# Patient Record
Sex: FEMALE | Race: WHITE | NOT HISPANIC OR LATINO | Employment: FULL TIME | URBAN - METROPOLITAN AREA
[De-identification: names, ages, dates, MRNs, and addresses within clinical notes are randomized per-mention and may not be internally consistent; named-entity substitution may affect disease eponyms.]

---

## 2015-12-28 LAB — HM COLONOSCOPY: NORMAL

## 2017-01-27 ENCOUNTER — GENERIC CONVERSION - ENCOUNTER (OUTPATIENT)
Dept: OTHER | Facility: OTHER | Age: 52
End: 2017-01-27

## 2017-02-03 ENCOUNTER — ALLSCRIPTS OFFICE VISIT (OUTPATIENT)
Dept: OTHER | Facility: OTHER | Age: 52
End: 2017-02-03

## 2017-04-07 ENCOUNTER — GENERIC CONVERSION - ENCOUNTER (OUTPATIENT)
Dept: OTHER | Facility: OTHER | Age: 52
End: 2017-04-07

## 2017-04-28 ENCOUNTER — GENERIC CONVERSION - ENCOUNTER (OUTPATIENT)
Dept: OTHER | Facility: OTHER | Age: 52
End: 2017-04-28

## 2017-04-28 ENCOUNTER — TRANSCRIBE ORDERS (OUTPATIENT)
Dept: ADMINISTRATIVE | Facility: HOSPITAL | Age: 52
End: 2017-04-28

## 2017-04-28 DIAGNOSIS — R20.1 HYPOESTHESIA: Primary | ICD-10-CM

## 2017-05-01 ENCOUNTER — GENERIC CONVERSION - ENCOUNTER (OUTPATIENT)
Dept: OTHER | Facility: OTHER | Age: 52
End: 2017-05-01

## 2017-05-12 ENCOUNTER — HOSPITAL ENCOUNTER (OUTPATIENT)
Dept: MRI IMAGING | Facility: HOSPITAL | Age: 52
Discharge: HOME | End: 2017-05-12
Payer: COMMERCIAL

## 2017-05-12 DIAGNOSIS — R20.1 HYPOESTHESIA: ICD-10-CM

## 2017-05-12 PROCEDURE — 72157 MRI CHEST SPINE W/O & W/DYE: CPT

## 2017-05-12 PROCEDURE — A9585 GADOBUTROL INJECTION: HCPCS | Performed by: RADIOLOGY

## 2017-05-12 RX ADMIN — GADOBUTROL 10 ML: 604.72 INJECTION INTRAVENOUS at 19:41

## 2017-06-12 ENCOUNTER — GENERIC CONVERSION - ENCOUNTER (OUTPATIENT)
Dept: OTHER | Facility: OTHER | Age: 52
End: 2017-06-12

## 2017-07-14 ENCOUNTER — GENERIC CONVERSION - ENCOUNTER (OUTPATIENT)
Dept: OTHER | Facility: OTHER | Age: 52
End: 2017-07-14

## 2017-08-04 ENCOUNTER — ALLSCRIPTS OFFICE VISIT (OUTPATIENT)
Dept: OTHER | Facility: OTHER | Age: 52
End: 2017-08-04

## 2017-09-12 DIAGNOSIS — Z12.31 ENCOUNTER FOR SCREENING MAMMOGRAM FOR MALIGNANT NEOPLASM OF BREAST: ICD-10-CM

## 2017-10-16 ENCOUNTER — TRANSCRIBE ORDERS (OUTPATIENT)
Dept: ADMINISTRATIVE | Facility: HOSPITAL | Age: 52
End: 2017-10-16

## 2017-10-16 DIAGNOSIS — R13.14 DYSPHAGIA, PHARYNGOESOPHAGEAL PHASE: Primary | ICD-10-CM

## 2017-10-20 ENCOUNTER — GENERIC CONVERSION - ENCOUNTER (OUTPATIENT)
Dept: OTHER | Facility: OTHER | Age: 52
End: 2017-10-20

## 2017-10-20 ENCOUNTER — HOSPITAL ENCOUNTER (OUTPATIENT)
Dept: MAMMOGRAPHY | Facility: CLINIC | Age: 52
Discharge: HOME | End: 2017-10-20
Payer: COMMERCIAL

## 2017-10-20 DIAGNOSIS — R92.8 OTHER ABNORMAL AND INCONCLUSIVE FINDINGS ON DIAGNOSTIC IMAGING OF BREAST: ICD-10-CM

## 2017-10-20 DIAGNOSIS — Z12.31 ENCOUNTER FOR SCREENING MAMMOGRAM FOR MALIGNANT NEOPLASM OF BREAST: ICD-10-CM

## 2017-10-20 PROCEDURE — 77063 BREAST TOMOSYNTHESIS BI: CPT

## 2017-10-20 PROCEDURE — G0202 SCR MAMMO BI INCL CAD: HCPCS

## 2017-10-25 ENCOUNTER — HOSPITAL ENCOUNTER (OUTPATIENT)
Dept: RADIOLOGY | Facility: HOSPITAL | Age: 52
Discharge: HOME | End: 2017-10-25
Attending: INTERNAL MEDICINE
Payer: COMMERCIAL

## 2017-10-25 DIAGNOSIS — R13.14 DYSPHAGIA, PHARYNGOESOPHAGEAL PHASE: ICD-10-CM

## 2017-10-25 PROCEDURE — 74220 X-RAY XM ESOPHAGUS 1CNTRST: CPT

## 2017-10-27 ENCOUNTER — HOSPITAL ENCOUNTER (OUTPATIENT)
Dept: ULTRASOUND IMAGING | Facility: CLINIC | Age: 52
Discharge: HOME | End: 2017-10-27
Payer: COMMERCIAL

## 2017-10-27 DIAGNOSIS — R92.8 OTHER ABNORMAL AND INCONCLUSIVE FINDINGS ON DIAGNOSTIC IMAGING OF BREAST: ICD-10-CM

## 2017-10-27 PROCEDURE — 76642 ULTRASOUND BREAST LIMITED: CPT

## 2018-01-11 NOTE — RESULT NOTES
Verified Results  * MRI SHOULDER LEFT WO CONTRAST [id number] 06:18PM Pasquale Skiff Order Number: [id number]    - Patient Instructions: To schedule this appointment, please contact Central Scheduling at [phone]  Test Name Result Flag Reference   MRI SHOULDER LEFT WO CONTRAST (Report)     This is a summary report  The complete report is available in the patient's medical record  If you cannot access the medical record, please contact the sending organization for a detailed fax or copy  MRI LEFT SHOULDER     INDICATION: Left shoulder pain with decreased range of motion  COMPARISON: Plain film dated 11/1/2016     TECHNIQUE:  The following MR sequences were obtained of the left shoulder: Localizer, axial GRE/PD fat sat, oblique coronal T2 fat sat, oblique sagittal T1/T2 fat sat  Images were acquired on a 1 5 Sarika unit  Gadolinium was not used  FINDINGS:     SUBCUTANEOUS TISSUES: Normal     JOINT EFFUSION: Small effusion  ACROMION PROCESS: Small subacromial spur  ROTATOR CUFF: Supraspinatus and infraspinatus insertional tendinosis without evidence of full-thickness component rotator cuff tear  The remaining muscles and tendons of the rotator cuff appear intact without muscle atrophy or fatty infiltration  SUBACROMIAL/SUBDELTOID BURSA: Trace bursal fluid  LONG HEAD OF BICEPS TENDON: Mild proximal biceps tendinosis without tear  GLENOID LABRUM: Intact  GLENOHUMERAL JOINT: Degenerative change noted with focal full-thickness cartilage loss at the anteroinferior glenoid  These findings are best appreciated on series 3 image 13 measuring up to 4 mm  ACROMIOCLAVICULAR JOINT: Normal      BONE MARROW SIGNAL: Normal        IMPRESSION:   1  Small subacromial spur with mild supraspinatus/infraspinatus insertional tendinosis but no rotator cuff tear  2  Mild proximal biceps tendinosis without tear     3  Glenohumeral degenerative change with focal full-thickness glenoid cartilage loss         Workstation performed: [id number]     Signed by:   Jt Power MD   11/8/16        )Pt aware & she is going to contact the [facility] she used in the past     Discussion/Summary   please notify pt that her MRI of the shoulder showed no rotator cuff tear but it did show tendonitis of the rotator cuff and bicep tendon tendonitis as well as significant arthritis of the shoulder with loss of cartilage, she needs to see Ortho for further eval

## 2018-01-12 VITALS
BODY MASS INDEX: 30.92 KG/M2 | HEIGHT: 70 IN | TEMPERATURE: 98.9 F | WEIGHT: 216 LBS | HEART RATE: 72 BPM | DIASTOLIC BLOOD PRESSURE: 70 MMHG | SYSTOLIC BLOOD PRESSURE: 122 MMHG

## 2018-01-12 VITALS
HEART RATE: 68 BPM | TEMPERATURE: 98.3 F | SYSTOLIC BLOOD PRESSURE: 112 MMHG | DIASTOLIC BLOOD PRESSURE: 62 MMHG | BODY MASS INDEX: 32.07 KG/M2 | WEIGHT: 224 LBS | HEIGHT: 70 IN

## 2018-01-13 NOTE — RESULT NOTES
Verified Results  (1) PTH N-TERMINAL (INTACT) [id number] 08:39AM Desirae Flair     Test Name Result Flag Reference   PTH, Intact 26 pg/mL  15-65        pt aware    Discussion/Summary   please notify pt that her calcium, phos and parathryoid levels were all nml - does not indicate any parthryoid abnormality - may discuss further with endo if she wishes

## 2018-01-13 NOTE — RESULT NOTES
Verified Results  (1) VITAMIN B12 [id number] 08:39AM Phuc Peña     Test Name Result Flag Reference   Vitamin B12 801 pg/mL  211-946     (1) FOLATE [id number] 08:39AM Phuc Peña     Test Name Result Flag Reference   Folate (Folic Acid), Serum 4 4 ng/mL  >3 0   A serum folate concentration of less than 3 1 ng/mL is  considered to represent clinical deficiency  ([address] [facility]) Lyme Ab/Western Blot Reflex [id number] 08:39AM Phuc Peña     Test Name Result Flag Reference   Lyme IgG/IgM Ab <0 91 ISR  0 00-0 90   Negative         <0 91                                                 Equivocal  0 91 - 1 09                                                 Positive         >1 09   Lyme Disease Ab, Quant, IgM <0 80 index  0 00-0 79   Negative         <0 80                                                 Equivocal  0 80 - 1 19                                                 Positive         >1 19                  IgM levels may peak at 3-6 weeks post infection, then                  gradually decline         Discussion/Summary   please notify pt that her Bw was all wnl; will call with MRI results when they come through after she has the imaging done

## 2018-01-13 NOTE — RESULT NOTES
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

## 2018-01-14 NOTE — RESULT NOTES
Verified Results  * MRI BRAIN WO CONTRAST [id number] 06:05PM Bety De Souza Order Number: [id number]    - Patient Instructions: To schedule this appointment, please contact Central Scheduling at [phone]  Test Name Result Flag Reference   MRI BRAIN WO CONTRAST (Report)     This is a summary report  The complete report is available in the patient's medical record  If you cannot access the medical record, please contact the sending organization for a detailed fax or copy  MRI BRAIN WITHOUT CONTRAST     INDICATION: Numbness, dizziness     COMPARISON:  MR 10/15/2009     TECHNIQUE: Sagittal T1, axial T2, axial FLAIR, axial T1, axial Gradient and axial diffusion imaging  IMAGE QUALITY: Diagnostic  FINDINGS:     BRAIN PARENCHYMA: There is no discrete mass, mass effect or midline shift  No abnormal white matter signal identified  Brainstem and cerebellum demonstrate normal signal  There is no intracranial hemorrhage  There is no evidence of acute infarction and   diffusion imaging is unremarkable  VENTRICLES: The ventricles are normal in size and contour  SELLA AND PITUITARY GLAND: Normal      ORBITS: Normal      PARANASAL SINUSES: Scattered retention cyst or polyps evident in both maxillary sinuses  VASCULATURE: Evaluation of the major intracranial vasculature demonstrates appropriate flow voids  CALVARIUM AND SKULL BASE: Normal      EXTRACRANIAL SOFT TISSUES: Normal        IMPRESSION:     Normal MR of the brain, unchanged since prior study 7 years earlier  Workstation performed: [id number]     Signed by:    Kaleb Burger MD   9/21/16        Pt aware    Discussion/Summary   please notify pt that her MRI of the brain was nml

## 2018-01-15 NOTE — RESULT NOTES
Verified Results  * XR SHOULDER 2+ VIEW LEFT [id number] 09:13AM Virginia Sims Order Number: [id number]     Test Name Result Flag Reference   XR SHOULDER 2+ VW LEFT (Report)     LEFT SHOULDER     INDICATION: Left shoulder discomfort  Difficulty with certainty  COMPARISON: February 2007     VIEWS: 3; 3 images     FINDINGS:     There is no acute fracture or dislocation  No degenerative changes  No lytic or blastic lesions are seen  Soft tissues are unremarkable  IMPRESSION:     No acute osseous abnormality         Workstation performed: [id number]     Signed by:   Mila Chase MD   11/1/16        Pt aware    Discussion/Summary   please notify pt that her shoulder Xray was nml - will prior - auth MRI of shoulder and we will let pt know when/if insurance approves it; would recommend heat/massage/gentle stretches and OTC Ibuprofen/Advil 200 mg 2-3 tab PO q 8 hrs prn pain

## 2018-01-16 NOTE — MISCELLANEOUS
Message   Recorded as Task   Date: 09/13/2016 12:05 PM, Created By: Erin Butt   Task Name: Follow Up   Assigned To: [address]   Regarding Patient: Berny Maravilla, Status: Active   Comment:    Desi Montenegro - 13 Sep 2016 12:05 PM     TASK CREATED  please notify pt that I did receive her BW results from her recent ER visit and her thyroid tests were wnl - con't with current plan as discussed at today's appt   Jennifer Easton - 13 Sep 2016 2:17 PM     TASK EDITED  Pt aware        Active Problems    1  Abnormal mammogram of right breast (793 80) (R92 8)   2  Bulging lumbar disc (722 10) (M51 26)   3  Cervicalgia (723 1) (M54 2)   4  Colon cancer screening (V76 51) (Z12 11)   5  Colonoscopy (Fiberoptic)   6  Compression of common peroneal nerve, 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)   7  Dizziness (780 4) (R42)   8  Encounter for screening mammogram for malignant neoplasm of breast (V76 12)   (Z12 31)   9  GERD (gastroesophageal reflux disease) (530 81) (K21 9)   10  Hypothyroidism (244 9) (E03 9)   11  Lower back pain (724 2) (M54 5)   12  Numbness (782 0) (R20 0)   13  PPD screening test (V74 1) (Z11 1)   14  Restless legs syndrome (333 94) (G25 81)   15  Sensory neuropathy (356 9) (G62 9)   16  Shoulder pain, left (719 41) (M25 512)   17  Skin lesion (709 9) (L98 9)   18  Vitamin D deficiency (268 9) (E55 9)    Current Meds   1  Calcium Citrate Plus Oral Tablet; TAKE 1 TABLET DAILY; Therapy: 27ZSA0411 to Recorded   2  [employer] Iron 325 (65 Fe) MG Oral Tablet; 1 tab PO q day; Therapy: 29JPI5471 to Recorded   3  Lysine HCl - 1000 MG Oral Tablet; TAKE AS DIRECTED; Therapy: 20KJF8063 to Recorded   4  Synthroid 75 MCG Oral Tablet; take 1 tablet every day; Therapy: 30AWI0538 to Recorded   5  Vitamin D3 1000 UNIT Oral Tablet; TAKE 1 TABLET DAILY; Therapy: 74ART0266 to (Evaluate:03Jun2016) Recorded   6  Vitamin E 400 UNIT Oral Tablet; TAKE 1 TABLET DAILY;    Therapy: 29WWN5160 to Recorded    Allergies    1   Penicillins    Signatures   Electronically signed by : Ashley Thomposn DO; Sep 13 2016  2:37PM EST                       (Author)

## 2018-01-16 NOTE — RESULT NOTES
Discussion/Summary    please notify pt that her mammo just showed a small 1 4 cm nodule at the R outer breast that needs further imaging to evaluate it - orders in allscripts and someone from breast center will be calling pt to schedule this further imaging, call our office if not heard from them by Tuesday next week  Pt aware         Verified Results  [address] [place] CAD [id number] 08:19AM Bety Going Order Number: [id number]    - Patient Instructions: To schedule this appointment, please contact Central Scheduling at [phone]  Do not wear any perfume, powder, lotion or deodorant on breast or underarm area  Please bring your doctors order, referral (if needed) and insurance information with you on the day of the test  Failure to bring this information may result in this test being rescheduled  Arrive 15 minutes prior to your appointment time to register  On the day of your test, please bring any prior mammogram or breast studies with you that were not performed at a [facility]  Failure to bring prior exams may result in your test needing to be rescheduled  Test Name Result Flag Reference   MAMMO SCREENING BILATERAL W 3D & CAD (Report)     Patient History:   Patient is nulliparous  Family history of unknown cancer at age 48 or over in paternal    aunt  Patient has never smoked  Patient's BMI is 31 3  Reason for exam: screening, asymptomatic  Screening     Mammo Screening Bilateral W DBT and CAD: October 20, 2017 - Check   In #: [de-identified]   2D/3D Procedure   3D views: Bilateral MLO view(s) were taken  2D views: Bilateral CC view(s) were taken  Technologist: Gianna Francisco R T (R)(M)   Prior study comparison: September 12, 2016, mammo diagnostic    right W CAD, performed at [address]  September 2, 2016, mammo screening bilateral W CAD, performed at    [facility]   June 13, 2015,    digital bilateral screening mammogram performed at [facility]  July 6, 2012, bilateral OPMA    digital scrn mammo w/CAD, performed at [address] [place] [address]  July 18, 2011, left breast unilateral    diagnostic mammogram, performed at [address] [facility]  June 8, 2011, bilateral OPMA digital scrn mammo w/CAD,    performed at [address] [place] [address]  June 7, 2010, bilateral OPMA digital scrn mammo w/CAD, performed at    [address] [place] [address]  The breast tissue is heterogeneously dense, potentially limiting    the sensitivity of mammography  Patient risk, included in this    report, assists in determining the appropriate screening regimen    (such as 3-D mammography or the inclusion of automated breast    ultrasound or MRI)  3-D mammography may also remain indicated as    screening  There is a 1 4 cm nodule in the upper outer right    breast at the 10 o'clock position, approximately 11 cm from the    nipple  Further evaluation with ultrasound is recommended  The parenchymal pattern is otherwise stable  There is a stable    nodule seen in the medial right breast, mid breast depth, shown    to correspond to a cyst on prior ultrasound  No dominant soft    tissue mass, architectural distortion or suspicious    calcifications are noted in either breast  The skin and nipple    contours are within normal limits  1  Outer right breast 1 4 cm nodule  Further evaluation    ultrasound is recommended  2  Stable left mammogram      ACR BI-RADSï¾® Assessments: BiRad:0 - Incomplete: needs additional    imaging evaluation - Right     Recommendation:   Further imaging of the right breast    A breast health care nurse from our facility will be contacting    the patient regarding the need for additional imaging  The patient is scheduled in a reminder system for screening    mammography       8-10% of cancers will be missed on mammography  Management of a    palpable abnormality must be based on clinical grounds  Patients    will be notified of their results via letter from our facility  Accredited by [employer] and FDA  Transcription Location: MARKUS [address]ust[address]: [id number]     Risk Value(s):   Tyrer-Cuzick 10 Year: 2 300%, Tyrer-Cuzick Lifetime: 10 000%,    Myriad Table: 1 5%, VARINDER 5 Year: 1 0%, NCI Lifetime: 9 1%   Signed by:   Nba Osborne MD   10/20/17       Plan  Abnormal mammogram of right breast    · *US BREAST RIGHT LIMITED (DIAGNOSTIC);  Status:Hold For - Scheduling; Requested  [facility]:[id number];

## 2018-01-17 NOTE — RESULT NOTES
Verified Results  * MAMMO SCREENING BILATERAL W CAD [id number] 07:56AM Lester Greer     Test Name Result Flag Reference   MAMMO SCREENING BILATERAL W CAD (Report)     Patient History:   Patient is nulliparous  Family history of unknown cancer in paternal aunt at age 48 or    over  Patient has never smoked  Patient's BMI is 31 3  Reason for exam: screening (asymptomatic)  Screening     Mammo Screening Bilateral W CAD: September 2, 2016 - Check In #:    [de-identified]   Bilateral MLO, CC, and XCCL view(s) were taken  Technologist: MARKUS Mejia (R)(M)   Prior study comparison: June 13, 2015, digital bilateral    screening mammogram, performed at [facility]  July 6, 2012, bilateral OPMA digital scrn    mammo w/CAD performed at [address] [facility]  July 18, 2011, left breast unilateral diagnostic    mammogram, performed at [address] [facility]  June 8, 2011, bilateral OPMA digital scrn mammo w/CAD performed at [facility] [facility]  The breast tissue is heterogeneously dense, potentially limiting    the sensitivity of mammography  Patient risk, included in this    report, assists in determining the appropriate screening regimen    (such as 3-D mammography or the inclusion of automated breast    ultrasound or MRI)  3-D mammography may also remain indicated as    screening  Right breast oval partially circumscribed partially    obscured mass requires further evaluation  No architectural    distortion or suspicious calcifications are noted in either    breast  The skin and nipple contours are within normal limits  Needs additional imaging with spot compression views   and likely ultrasound       ASSESSMENT: BiRad:0 - Incomplete: needs additional imaging    evaluation - Right     Recommendation:   Further imaging of the right breast    A breast health care nurse from our facility will be contacting    the patient regarding the need for additional imaging  Analyzed by CAD     8-10% of cancers will be missed on mammography  Management of a    palpable abnormality must be based on clinical grounds  Patients   will be notified of their results via letter from our facility  Accredited by [employer] and FDA  Transcription Location: MARK[facility] [address]: [id number]     Risk Value(s):   Tyrer-Cuzick 10 Year: 2 279%, Tyrer-Cuzick Lifetime: 10 417%,    Myriad Table: 1 5%, VARINDER 5 Year: 0 9%, NCI Lifetime: 9 2%   Signed by:   Saloni Banks MD   9/2/16       Plan  Abnormal mammogram of right breast    · MAMMO DIAGNOSTIC RIGHT W CAD; Status:Hold For - Scheduling; Requested  for:[id number];     Discussion/Summary    please notify pt that her R breast was abnormal on mammo and needs additional imaging - someone from breast center should be calling her to schedule this but if she has not heard from them by the end of next week or needs any further assitance from us please call; orders for additinal imaging put in allscripts  PATIENT AWARE

## 2018-01-30 RX ORDER — MELATONIN
1 DAILY
COMMUNITY
Start: 2015-06-09

## 2018-01-30 RX ORDER — FERROUS SULFATE 325(65) MG
1 TABLET ORAL DAILY
COMMUNITY
Start: 2015-06-09

## 2018-01-30 RX ORDER — RANITIDINE 150 MG/1
1 TABLET ORAL DAILY
COMMUNITY
Start: 2017-08-04 | End: 2020-09-25 | Stop reason: ALTCHOICE

## 2018-01-30 RX ORDER — LEVOTHYROXINE SODIUM 0.07 MG/1
1 TABLET ORAL DAILY
COMMUNITY
Start: 2014-02-04 | End: 2018-12-24 | Stop reason: SDUPTHER

## 2018-01-30 RX ORDER — CYANOCOBALAMIN (VITAMIN B-12) 500 MCG
1 LOZENGE ORAL DAILY
COMMUNITY
Start: 2015-06-09 | End: 2018-02-05 | Stop reason: ALTCHOICE

## 2018-02-05 ENCOUNTER — OFFICE VISIT (OUTPATIENT)
Dept: FAMILY MEDICINE CLINIC | Facility: HOSPITAL | Age: 53
End: 2018-02-05
Payer: COMMERCIAL

## 2018-02-05 VITALS
DIASTOLIC BLOOD PRESSURE: 82 MMHG | BODY MASS INDEX: 34.36 KG/M2 | SYSTOLIC BLOOD PRESSURE: 128 MMHG | HEIGHT: 70 IN | HEART RATE: 78 BPM | TEMPERATURE: 97.7 F | WEIGHT: 240 LBS

## 2018-02-05 DIAGNOSIS — R20.2 PARESTHESIA: ICD-10-CM

## 2018-02-05 DIAGNOSIS — Z00.00 ROUTINE HEALTH MAINTENANCE: Primary | ICD-10-CM

## 2018-02-05 DIAGNOSIS — G62.9 SENSORY NEUROPATHY: ICD-10-CM

## 2018-02-05 DIAGNOSIS — R92.8 ABNORMAL MAMMOGRAM OF RIGHT BREAST: ICD-10-CM

## 2018-02-05 DIAGNOSIS — Q84.6 NAIL ANOMALY: ICD-10-CM

## 2018-02-05 PROCEDURE — 1036F TOBACCO NON-USER: CPT | Performed by: INTERNAL MEDICINE

## 2018-02-05 PROCEDURE — 99214 OFFICE O/P EST MOD 30 MIN: CPT | Performed by: INTERNAL MEDICINE

## 2018-02-05 PROCEDURE — 3008F BODY MASS INDEX DOCD: CPT | Performed by: INTERNAL MEDICINE

## 2018-02-05 RX ORDER — FOLIC ACID 1 MG/1
1 TABLET ORAL DAILY
Qty: 30 TABLET | Refills: 0 | Status: SHIPPED | OUTPATIENT
Start: 2018-02-05

## 2018-02-05 NOTE — PROGRESS NOTES
Assessment/Plan:    Sensory neuropathy  Unchanged, nml EMG and MRI, con't to defer Gabapentin/Lyrica, no longer following with neuro, call with new/worsening symptoms    Paresthesia  No new/worsening symptoms, neg w/u (BW/MRI/EMG), deferring medications       Diagnoses and all orders for this visit:    Routine health maintenance  -     folic acid (FOLVITE) 1 mg tablet; Take 1 tablet (1 mg total) by mouth daily  -     magnesium oxide (MAG-OX) 400 mg; Take 1 tablet (400 mg total) by mouth daily for 30 doses    Sensory neuropathy    Paresthesia    Abnormal mammogram of right breast  Comments:  f/u breast US in April - order given, call with skin changes/rash/nipple discharge/masses/new or worsening pain  Orders:  -     US breast right limited (diagnostic); Future    Nail anomaly  Comments:  No s/sx of active infection, will give number for Derm  Orders:  -     Ambulatory referral to Dermatology; Future      Urged to call [facility] for PAP    UTD on [place]    Subjective:      Patient ID: Kecia Beckford is a 50 y o  female  HPI Pt here for routine follow up appt    She con't to c/o numbness and has had no new or worsening symptoms  They are still intermittent and she still cannot id a trigger  She con't to defer any neuropathic pain medications  She saw Neuro also and they cannot find any abnormality either  She is not following with them regularly  She notes no recent falls  She just started going to the gym again and is not noting any difficulty  She notes she con't to feel difficulty swallowing  She had recent EGD with dilation and really noted no benefit  She is taking Zantac - not daily but "frequently"  She notes intermittent heart burn and the Zantac does help  She has changed her diet and it has helped a little  She notes no N/V/abd pain/blood in stool/F/C  She has not had a PAP recently - does follow with [address]    She did have a mammo in Oct and had to have f/u US - 1 1 cm oval mass in the R breast was again noted  Likely benign and 6 mo follow up was recommended  She notes B/L breast pain and lateral rib pain  She notes no nipple discharge/skin changes  She has had some L great toe nail issues  At the base of the toe nail she noted some debris and removed it  Her nail has some pitting/abnormality to it  She states it has been present and not improved over the past year or so  Review of Systems   Constitutional: Negative for chills and fever  HENT: Positive for trouble swallowing  Negative for congestion, sore throat and voice change  Eyes: Negative for pain and discharge  Respiratory: Positive for cough and shortness of breath  Cardiovascular: Negative for chest pain and leg swelling  Gastrointestinal: Positive for constipation  Negative for abdominal pain, diarrhea and nausea  Endocrine: Negative for polydipsia and polyuria  Genitourinary: Negative for difficulty urinating and dysuria  Musculoskeletal: Negative for back pain and joint swelling  Neurological: Positive for dizziness and numbness  Psychiatric/Behavioral: Negative for confusion  The patient is not nervous/anxious  Objective:     Physical Exam   Constitutional: She appears well-developed and well-nourished  No distress  HENT:   Head: Normocephalic and atraumatic  Eyes: Conjunctivae are normal  Pupils are equal, round, and reactive to light  No scleral icterus  Neck: Normal range of motion  Neck supple  No tracheal deviation present  Cardiovascular: Normal rate, regular rhythm and normal heart sounds  No murmur heard  Pulmonary/Chest: Effort normal and breath sounds normal  No respiratory distress  She has no wheezes  She has no rales  Abdominal: Soft  She exhibits no distension  There is no tenderness  Musculoskeletal: She exhibits no edema  Neurological: She exhibits normal muscle tone  Skin: Skin is warm and dry  No rash noted     L great toe with some pitting to nail but nail bed w/o erythema/drainage/tenderness   Psychiatric: Judgment normal

## 2018-02-05 NOTE — ASSESSMENT & PLAN NOTE
Unchanged, nml EMG and MRI, con't to defer Gabapentin/Lyrica, no longer following with neuro, call with new/worsening symptoms

## 2018-04-30 ENCOUNTER — HOSPITAL ENCOUNTER (OUTPATIENT)
Dept: ULTRASOUND IMAGING | Facility: CLINIC | Age: 53
Discharge: HOME | End: 2018-04-30
Payer: COMMERCIAL

## 2018-04-30 DIAGNOSIS — R92.8 ABNORMAL MAMMOGRAM OF RIGHT BREAST: ICD-10-CM

## 2018-04-30 PROCEDURE — 76642 ULTRASOUND BREAST LIMITED: CPT

## 2018-09-24 ENCOUNTER — DOCUMENTATION (OUTPATIENT)
Dept: ENDOCRINOLOGY | Facility: HOSPITAL | Age: 53
End: 2018-09-24

## 2018-12-03 ENCOUNTER — TELEPHONE (OUTPATIENT)
Dept: FAMILY MEDICINE CLINIC | Facility: HOSPITAL | Age: 53
End: 2018-12-03

## 2018-12-03 DIAGNOSIS — R92.8 ABNORMAL MAMMOGRAM: Primary | ICD-10-CM

## 2018-12-03 RX ORDER — LEVOTHYROXINE SODIUM 75 MCG
TABLET ORAL
Qty: 30 TABLET | Refills: 11 | OUTPATIENT
Start: 2018-12-03

## 2018-12-03 NOTE — TELEPHONE ENCOUNTER
Got postcard that she is due for mammogram  She needs orders put in for Mammogram of both breasts and Ultrasound of right breast  PCB when orders are done so she can sched

## 2018-12-07 ENCOUNTER — HOSPITAL ENCOUNTER (OUTPATIENT)
Dept: MAMMOGRAPHY | Facility: CLINIC | Age: 53
Discharge: HOME | End: 2018-12-07
Payer: COMMERCIAL

## 2018-12-07 ENCOUNTER — HOSPITAL ENCOUNTER (OUTPATIENT)
Dept: ULTRASOUND IMAGING | Facility: CLINIC | Age: 53
Discharge: HOME | End: 2018-12-07
Payer: COMMERCIAL

## 2018-12-07 VITALS — WEIGHT: 233 LBS | BODY MASS INDEX: 33.36 KG/M2 | HEIGHT: 70 IN

## 2018-12-07 DIAGNOSIS — R92.8 ABNORMAL MAMMOGRAM: ICD-10-CM

## 2018-12-07 PROCEDURE — G0279 TOMOSYNTHESIS, MAMMO: HCPCS

## 2018-12-07 PROCEDURE — 77066 DX MAMMO INCL CAD BI: CPT

## 2018-12-07 PROCEDURE — 76642 ULTRASOUND BREAST LIMITED: CPT

## 2018-12-20 RX ORDER — LEVOTHYROXINE SODIUM 75 MCG
TABLET ORAL
Qty: 30 TABLET | Refills: 11 | OUTPATIENT
Start: 2018-12-20

## 2018-12-24 ENCOUNTER — TELEPHONE (OUTPATIENT)
Dept: ENDOCRINOLOGY | Facility: HOSPITAL | Age: 53
End: 2018-12-24

## 2018-12-24 DIAGNOSIS — E03.9 HYPOTHYROIDISM, UNSPECIFIED TYPE: Primary | ICD-10-CM

## 2018-12-24 RX ORDER — LEVOTHYROXINE SODIUM 75 MCG
75 TABLET ORAL DAILY
Qty: 30 TABLET | Refills: 3 | Status: SHIPPED | OUTPATIENT
Start: 2018-12-24 | End: 2019-04-01 | Stop reason: SDUPTHER

## 2018-12-24 NOTE — TELEPHONE ENCOUNTER
Previous BME patient needs a refill of her Synthroid  She is completely out of medication   She has appt with you on 4/1

## 2018-12-24 NOTE — TELEPHONE ENCOUNTER
Patient lm with answering service that "rx needs to be called in"  It does not look like patient has any appointments with our office  pls call when done

## 2019-03-26 ENCOUNTER — TELEPHONE (OUTPATIENT)
Dept: ENDOCRINOLOGY | Facility: HOSPITAL | Age: 54
End: 2019-03-26

## 2019-03-26 DIAGNOSIS — E06.3 HYPOTHYROIDISM DUE TO HASHIMOTO'S THYROIDITIS: Primary | ICD-10-CM

## 2019-03-26 DIAGNOSIS — E03.8 HYPOTHYROIDISM DUE TO HASHIMOTO'S THYROIDITIS: Primary | ICD-10-CM

## 2019-03-26 DIAGNOSIS — E04.2 GOITER, NONTOXIC, MULTINODULAR: ICD-10-CM

## 2019-03-26 NOTE — TELEPHONE ENCOUNTER
Previous BME patient has appt with you on 4/1  She see's you for Thyroid  She wants to know what labs you want done before that appt?

## 2019-03-29 LAB
T4 FREE SERPL-MCNC: 1.51 NG/DL (ref 0.82–1.77)
THYROGLOB AB SERPL-ACNC: <1 IU/ML (ref 0–0.9)
THYROPEROXIDASE AB SERPL-ACNC: 119 IU/ML (ref 0–34)
TSH SERPL DL<=0.005 MIU/L-ACNC: 0.7 UIU/ML (ref 0.45–4.5)

## 2019-04-01 ENCOUNTER — OFFICE VISIT (OUTPATIENT)
Dept: ENDOCRINOLOGY | Facility: HOSPITAL | Age: 54
End: 2019-04-01
Payer: COMMERCIAL

## 2019-04-01 VITALS
HEART RATE: 85 BPM | WEIGHT: 242.2 LBS | SYSTOLIC BLOOD PRESSURE: 120 MMHG | BODY MASS INDEX: 34.67 KG/M2 | DIASTOLIC BLOOD PRESSURE: 82 MMHG | HEIGHT: 70 IN

## 2019-04-01 DIAGNOSIS — E04.2 GOITER, NONTOXIC, MULTINODULAR: ICD-10-CM

## 2019-04-01 DIAGNOSIS — E03.8 HYPOTHYROIDISM DUE TO HASHIMOTO'S THYROIDITIS: Primary | ICD-10-CM

## 2019-04-01 DIAGNOSIS — E06.3 HYPOTHYROIDISM DUE TO HASHIMOTO'S THYROIDITIS: Primary | ICD-10-CM

## 2019-04-01 DIAGNOSIS — E03.9 HYPOTHYROIDISM, UNSPECIFIED TYPE: ICD-10-CM

## 2019-04-01 PROCEDURE — 99214 OFFICE O/P EST MOD 30 MIN: CPT | Performed by: INTERNAL MEDICINE

## 2019-04-01 RX ORDER — LANOLIN ALCOHOL/MO/W.PET/CERES
1000 CREAM (GRAM) TOPICAL DAILY
COMMUNITY

## 2019-04-01 RX ORDER — LEVOTHYROXINE SODIUM 75 MCG
75 TABLET ORAL DAILY
Qty: 30 TABLET | Refills: 11 | Status: SHIPPED | OUTPATIENT
Start: 2019-04-01 | End: 2020-03-26 | Stop reason: SDUPTHER

## 2019-05-24 ENCOUNTER — TELEPHONE (OUTPATIENT)
Dept: FAMILY MEDICINE CLINIC | Facility: HOSPITAL | Age: 54
End: 2019-05-24

## 2019-09-30 ENCOUNTER — TELEPHONE (OUTPATIENT)
Dept: FAMILY MEDICINE CLINIC | Facility: HOSPITAL | Age: 54
End: 2019-09-30

## 2019-10-08 ENCOUNTER — TRANSCRIBE ORDERS (OUTPATIENT)
Dept: ADMINISTRATIVE | Facility: HOSPITAL | Age: 54
End: 2019-10-08

## 2019-10-08 DIAGNOSIS — N64.4 BREAST PAIN: Primary | ICD-10-CM

## 2019-12-09 ENCOUNTER — HOSPITAL ENCOUNTER (OUTPATIENT)
Dept: ULTRASOUND IMAGING | Facility: CLINIC | Age: 54
Discharge: HOME | End: 2019-12-09
Payer: COMMERCIAL

## 2019-12-09 ENCOUNTER — HOSPITAL ENCOUNTER (OUTPATIENT)
Dept: MAMMOGRAPHY | Facility: CLINIC | Age: 54
Discharge: HOME | End: 2019-12-09
Payer: COMMERCIAL

## 2019-12-09 VITALS — HEIGHT: 70 IN | BODY MASS INDEX: 34.65 KG/M2 | WEIGHT: 242 LBS

## 2019-12-09 DIAGNOSIS — Z09 FOLLOW-UP EXAM, 3-6 MONTHS SINCE PREVIOUS EXAM: ICD-10-CM

## 2019-12-09 DIAGNOSIS — N64.4 BREAST PAIN: ICD-10-CM

## 2019-12-09 PROCEDURE — 77066 DX MAMMO INCL CAD BI: CPT

## 2019-12-09 PROCEDURE — 76642 ULTRASOUND BREAST LIMITED: CPT

## 2019-12-09 PROCEDURE — G0279 TOMOSYNTHESIS, MAMMO: HCPCS

## 2020-03-26 ENCOUNTER — TELEPHONE (OUTPATIENT)
Dept: ENDOCRINOLOGY | Facility: HOSPITAL | Age: 55
End: 2020-03-26

## 2020-03-26 DIAGNOSIS — E04.2 GOITER, NONTOXIC, MULTINODULAR: ICD-10-CM

## 2020-03-26 DIAGNOSIS — E06.3 HYPOTHYROIDISM DUE TO HASHIMOTO'S THYROIDITIS: Primary | ICD-10-CM

## 2020-03-26 DIAGNOSIS — E03.9 HYPOTHYROIDISM, UNSPECIFIED TYPE: ICD-10-CM

## 2020-03-26 DIAGNOSIS — E03.8 HYPOTHYROIDISM DUE TO HASHIMOTO'S THYROIDITIS: Primary | ICD-10-CM

## 2020-03-26 RX ORDER — LEVOTHYROXINE SODIUM 75 MCG
75 TABLET ORAL DAILY
Qty: 30 TABLET | Refills: 11 | Status: SHIPPED | OUTPATIENT
Start: 2020-03-26 | End: 2021-03-29

## 2020-03-26 NOTE — TELEPHONE ENCOUNTER
Done
Mailed labs   Patient informed
Pt needs renewal of synthroid brand necessary/ 75 mcg/ 30 day plus refills to last until 6/16/ send to [facility]
Pt rescheduled 4/3/20 to 20  She does not want to get labs done now  Can you reorder labs since they will   Can be mailed 
78

## 2020-06-13 ENCOUNTER — TELEPHONE (OUTPATIENT)
Dept: OTHER | Facility: OTHER | Age: 55
End: 2020-06-13

## 2020-09-17 LAB
T4 FREE SERPL-MCNC: 1.66 NG/DL (ref 0.82–1.77)
TSH SERPL DL<=0.005 MIU/L-ACNC: 0.44 UIU/ML (ref 0.45–4.5)

## 2020-09-25 ENCOUNTER — OFFICE VISIT (OUTPATIENT)
Dept: ENDOCRINOLOGY | Facility: HOSPITAL | Age: 55
End: 2020-09-25
Payer: COMMERCIAL

## 2020-09-25 VITALS
BODY MASS INDEX: 29.2 KG/M2 | SYSTOLIC BLOOD PRESSURE: 124 MMHG | DIASTOLIC BLOOD PRESSURE: 82 MMHG | HEART RATE: 72 BPM | HEIGHT: 70 IN | WEIGHT: 204 LBS | TEMPERATURE: 97.6 F

## 2020-09-25 DIAGNOSIS — E04.2 GOITER, NONTOXIC, MULTINODULAR: ICD-10-CM

## 2020-09-25 DIAGNOSIS — E06.3 HYPOTHYROIDISM DUE TO HASHIMOTO'S THYROIDITIS: Primary | ICD-10-CM

## 2020-09-25 DIAGNOSIS — E03.8 HYPOTHYROIDISM DUE TO HASHIMOTO'S THYROIDITIS: Primary | ICD-10-CM

## 2020-09-25 PROCEDURE — 99213 OFFICE O/P EST LOW 20 MIN: CPT | Performed by: INTERNAL MEDICINE

## 2020-09-25 RX ORDER — NICOTINE POLACRILEX 2 MG
GUM BUCCAL DAILY
COMMUNITY

## 2020-09-25 NOTE — PATIENT INSTRUCTIONS
The thyroid may be going a bit overactive  No change in synthroid 75 mcg daily  Follow up in 1 year with blood work

## 2020-09-25 NOTE — PROGRESS NOTES
9/25/2020    Assessment/Plan      Diagnoses and all orders for this visit:    Hypothyroidism due to Hashimoto's thyroiditis  -     TSH, 3rd generation Lab Collect; Future  -     T4, free Lab Collect; Future  -     TSH, 3rd generation Lab Collect  -     T4, free Lab Collect    Goiter, nontoxic, multinodular  -     TSH, 3rd generation Lab Collect; Future  -     T4, free Lab Collect; Future  -     TSH, 3rd generation Lab Collect  -     T4, free Lab Collect    Other orders  -     Biotin 1 MG CAPS; Take by mouth daily        Assessment/Plan:  1  Hypothyroidism due to Hashimoto's thyroiditis  Most recent TSH is just a little bit on the overactive side  She is asymptomatic  For now, she will continue the same Synthroid 75 mcg daily  I warned her that as she continues to lose weight, we may need to decrease her thyroid hormone dosage  We reviewed hyperthyroid symptoms and she will call if she has any hyperthyroid symptoms  2  Nontoxic multinodular goiter  She has no compressive thyroid symptoms  Nodules have been subcentimeter in the past and there is no need to repeat her thyroid ultrasound unless she has compressive symptomatology  I have asked her to follow up in 1 year with preceding TSH and free T4       CC:  Hypothyroid and thyroid nodule follow-up    History of Present Illness     HPI: Mayra Cuellar is a 50y o  year old female with history of hypothyroidism due to Hashimoto's thyroiditis with history of small thyroid nodule in the setting of a multinodular goiter for follow-up visit  She was diagnosed with Hashimoto's thyroid disease in her 25s  She has been on thyroid hormone replacement ever since  She is taking synthroid brand 75 mcg daily  She has fatigue at times but generally feels better since she switched to a plant based diet  She denies tremors, diarrhea, anxiety or depression  She has occasional palpitations  She can be hot at night and cold during the day    She can have constipation at times  She has sleeping problems due to her TMJ and deviated septum as she snores at night  She feels she is losing but more hair  She continues to have chronic dry skin and brittle nails  She denies diplopia  Thyroid nodules were noted since 2009  She has been followed with serial ultrasounds  Last thyroid ultrasound was 2016 showing stable subcentimeter size thyroid nodules  She is starting to notice some swallowing becoming difficult again like when she had a previous esophageal dilation  Review of Systems   Constitutional: Positive for fatigue  Negative for unexpected weight change  Weight 38 lb less than last year  Walking every day since working from home over [employer]  Fatigue at times  Eating a plant based diet for 1 year, no meat, dairy, eggs  Limits salt intake  Feels better in general since changed diet  HENT: Positive for trouble swallowing  Swallowing is becoming difficult again  Esophageal dilation helped int he past for awhile  Will choke while drinking  Eyes: Negative for visual disturbance  No diplopia  Respiratory: Negative for chest tightness and shortness of breath  Cardiovascular: Positive for palpitations  Negative for chest pain  Will get occasional heart palpitations  Gastrointestinal: Positive for constipation  Negative for abdominal pain, diarrhea and nausea  Heartburn batter now with change on diet  Can have constipation at times  Endocrine: Positive for cold intolerance and heat intolerance  Much more hot at night, cold during the day  Genitourinary:        Menses gone for a while  Musculoskeletal: Positive for neck pain  Will get shooting pains in neck on the sides  Skin: Negative for rash  Feels losing a lot more hair  Has chronic drys kin  has brittle nails  Getting a lot of ridges  Neurological: Positive for dizziness, light-headedness, numbness and headaches  Negative for tremors  Still having numbness in various minesh  Some posterior occipital headaches with stress  occasional dizzy and lightheaded  Psychiatric/Behavioral: Positive for sleep disturbance  Negative for dysphoric mood  The patient is not nervous/anxious  Has TMJ and deviated septum so does not sleep well in general as snores         Historical Information   Past Medical History:   Diagnosis Date    Disc displacement, thoracic     T6    GERD (gastroesophageal reflux disease)     Hashimoto's thyroiditis     Herniated cervical disc     Paresthesia     Thyroid nodule      Past Surgical History:   Procedure Laterality Date    COLONOSCOPY  12/28/2015    Rectal polyp removed- hyperplastic polyp; repeat in 10 years     DENTAL SURGERY      teeth extractions, wisdom teeth    ESOPHAGEAL DILATION      ESOPHAGOGASTRODUODENOSCOPY  04/25/2016    KNEE ARTHROSCOPY Left      Social History   Social History     Substance and Sexual Activity   Alcohol Use Yes    Frequency: Monthly or less    Comment: social      Social History     Substance and Sexual Activity   Drug Use Not Currently     Social History     Tobacco Use   Smoking Status Never Smoker   Smokeless Tobacco Never Used     Family History:   Family History   Problem Relation Age of Onset    Atrial fibrillation Mother     Diabetes type II Mother     Skin cancer Father     Colonic polyp Father     Prostate cancer Father 61    Arrhythmia Family     Thyroid disease Family     Colonic polyp Family     Lupus Family     Cervical cancer Paternal Aunt 61    Mitral valve prolapse Sister     No Known Problems Brother     No Known Problems Sister        Meds/Allergies   Current Outpatient Medications   Medication Sig Dispense Refill    Biotin 1 MG CAPS Take by mouth daily      cholecalciferol (VITAMIN D3) 1,000 units tablet Take 1 tablet by mouth daily      cyanocobalamin (VITAMIN B-12) 1,000 mcg tablet Take 1,000 mcg by mouth daily      ferrous sulfate (IRON SUPPLEMENT) 325 (65 Fe) mg tablet Take 1 tablet by mouth daily prn      folic acid (FOLVITE) 1 mg tablet Take 1 tablet (1 mg total) by mouth daily (Patient taking differently: Take 1 mg by mouth daily prn) 30 tablet 0    Lysine 1000 MG TABS Take 1 tablet by mouth daily      magnesium oxide (MAG-OX) 400 mg Take 1 tablet (400 mg total) by mouth daily for 30 doses 30 tablet 0    SYNTHROID 75 MCG tablet Take 1 tablet (75 mcg total) by mouth daily 30 tablet 11     No current facility-administered medications for this visit  Allergies   Allergen Reactions    Penicillin G Benzathine Other (See Comments)     Childhood reaction    Penicillins Other (See Comments)     Childhood reaction       Objective   Vitals: Blood pressure 124/82, pulse 72, temperature 97 6 °F (36 4 °C), height 5' 9 5" (1 765 m), weight 92 5 kg (204 lb)  Invasive Devices     None                 Physical Exam  Vitals signs reviewed  Constitutional:       Appearance: Normal appearance  She is well-developed  HENT:      Head: Normocephalic and atraumatic  Eyes:      Extraocular Movements: Extraocular movements intact  Conjunctiva/sclera: Conjunctivae normal       Comments: No lid lag, stare, proptosis, or periorbital edema  Neck:      Musculoskeletal: Normal range of motion and neck supple  Muscular tenderness present  Thyroid: No thyromegaly  Vascular: No carotid bruit  Comments: Tender over the thyroid lobes  No thyroid enlargement  No palpable nodules  Cardiovascular:      Rate and Rhythm: Normal rate and regular rhythm  Heart sounds: Normal heart sounds  No murmur  Pulmonary:      Effort: Pulmonary effort is normal       Breath sounds: Normal breath sounds  No wheezing  Abdominal:      Palpations: Abdomen is soft  Tenderness: There is no abdominal tenderness  Musculoskeletal: Normal range of motion  General: No deformity  Right lower leg: No edema        Left lower leg: No edema  Comments: No tremor of the outstretched hands  Lymphadenopathy:      Cervical: No cervical adenopathy  Skin:     General: Skin is warm and dry  Findings: No rash  Neurological:      Mental Status: She is alert and oriented to person, place, and time  Deep Tendon Reflexes: Reflexes are normal and symmetric  Comments: Deep tendon reflexes normal          The history was obtained from the review of the chart and from the patient      Lab Results:      Blood work done on 09/16/2020 demonstrates the following results:    Lab Results   Component Value Date    TSH 0 440 (L) 09/16/2020    FREET4 1 66 09/16/2020       Future Appointments   Date Time Provider Chato Gil   9/24/2021  8:20 AM Dulce Maria Polo MD [facility]

## 2021-02-16 ENCOUNTER — TELEPHONE (OUTPATIENT)
Dept: FAMILY MEDICINE CLINIC | Facility: HOSPITAL | Age: 56
End: 2021-02-16

## 2021-02-16 DIAGNOSIS — Z12.31 ENCOUNTER FOR SCREENING MAMMOGRAM FOR BREAST CANCER: Primary | ICD-10-CM

## 2021-02-16 NOTE — TELEPHONE ENCOUNTER
Patient called asking for Mammo to be ordered  She would like to schedule her appointment      Raheem can be reached at [phone]

## 2021-03-27 DIAGNOSIS — E03.9 HYPOTHYROIDISM, UNSPECIFIED TYPE: ICD-10-CM

## 2021-03-29 RX ORDER — LEVOTHYROXINE SODIUM 75 MCG
TABLET ORAL
Qty: 30 TABLET | Refills: 3 | Status: SHIPPED | OUTPATIENT
Start: 2021-03-29 | End: 2021-08-09

## 2021-04-15 ENCOUNTER — HOSPITAL ENCOUNTER (OUTPATIENT)
Dept: MAMMOGRAPHY | Facility: IMAGING CENTER | Age: 56
Discharge: HOME | End: 2021-04-15
Payer: COMMERCIAL

## 2021-04-15 VITALS — HEIGHT: 69 IN | BODY MASS INDEX: 30.21 KG/M2 | WEIGHT: 204 LBS

## 2021-04-15 DIAGNOSIS — Z12.31 ENCOUNTER FOR SCREENING MAMMOGRAM FOR BREAST CANCER: ICD-10-CM

## 2021-04-15 PROCEDURE — 77067 SCR MAMMO BI INCL CAD: CPT

## 2021-04-15 PROCEDURE — 77063 BREAST TOMOSYNTHESIS BI: CPT

## 2021-04-20 ENCOUNTER — TELEPHONE (OUTPATIENT)
Dept: FAMILY MEDICINE CLINIC | Facility: HOSPITAL | Age: 56
End: 2021-04-20

## 2021-04-20 ENCOUNTER — TELEPHONE (OUTPATIENT)
Dept: MAMMOGRAPHY | Facility: CLINIC | Age: 56
End: 2021-04-20

## 2021-04-20 NOTE — PROGRESS NOTES
We were unsuccessful in contacting the above patient for her diagnostic follow up mammogram/ultrasound  I have left messages for her on 4/16 and 4/20 with no response to schedule  Please attempt to contact this patient and have them schedule their appointment  Please let me know if you have any questions or if I can be of any further assistance      Thank you,  Beny Padilla, ALCIDESN, RN  Breast Nurse Navigator

## 2021-04-20 NOTE — TELEPHONE ENCOUNTER
Spoke to pt  Gave her phone number for nurse navigator and transferred her to schedule follow up imagining

## 2021-04-20 NOTE — TELEPHONE ENCOUNTER
----- Message from Laura Sibley DO sent at 4/20/2021 10:19 AM EDT -----  Regarding: FW: follow up  Please contact pt and let her know that the [facility] breast center has been trying to reach her to schedule f/u imaging needed for abnormal mammo - please give pt any assistance that is needed  Additional studies already signed off in chart    ----- Message -----  From: Brennon Mayen RN  Sent: 4/20/2021   9:45 AM EDT  To: Laura Sibley DO  Subject: follow up                                        We were unsuccessful in contacting the above patient for her diagnostic follow up mammogram/ultrasound  I have left messages for her on 4/16 and 4/20 with no response to schedule  Please attempt to contact this patient and have them schedule their appointment  Please let me know if you have any questions or if I can be of any further assistance      Thank you,  Brennon Mayen, [facility]  Breast Nurse Navigator

## 2021-04-30 ENCOUNTER — HOSPITAL ENCOUNTER (OUTPATIENT)
Dept: ULTRASOUND IMAGING | Facility: CLINIC | Age: 56
Discharge: HOME | End: 2021-04-30
Payer: COMMERCIAL

## 2021-04-30 ENCOUNTER — HOSPITAL ENCOUNTER (OUTPATIENT)
Dept: MAMMOGRAPHY | Facility: CLINIC | Age: 56
Discharge: HOME | End: 2021-04-30
Payer: COMMERCIAL

## 2021-04-30 VITALS — HEIGHT: 69 IN | WEIGHT: 204 LBS | BODY MASS INDEX: 30.21 KG/M2

## 2021-04-30 DIAGNOSIS — R92.8 ABNORMAL SCREENING MAMMOGRAM: ICD-10-CM

## 2021-04-30 PROCEDURE — G0279 TOMOSYNTHESIS, MAMMO: HCPCS

## 2021-04-30 PROCEDURE — 77065 DX MAMMO INCL CAD UNI: CPT

## 2021-04-30 NOTE — PROGRESS NOTES
Met with patient and Dr Amelia Alonzo  regarding recommendation for;    __X___ RIGHT ______LEFT      _____Ultrasound guided  ____X__Stereotactic breast biopsy  __X___Verbalized understanding        Blood thinners:  __X___yes _____no (ASA occasional)    Date stopped: ___N/A____    Biopsy teaching sheet given:  __X___yes ____no    Pt given contact information and adv to call with any questions/needs

## 2021-05-13 ENCOUNTER — HOSPITAL ENCOUNTER (OUTPATIENT)
Dept: MAMMOGRAPHY | Facility: CLINIC | Age: 56
Discharge: HOME | End: 2021-05-13
Payer: COMMERCIAL

## 2021-05-13 VITALS
HEART RATE: 68 BPM | HEIGHT: 69 IN | SYSTOLIC BLOOD PRESSURE: 118 MMHG | BODY MASS INDEX: 30.21 KG/M2 | DIASTOLIC BLOOD PRESSURE: 80 MMHG | WEIGHT: 204 LBS

## 2021-05-13 DIAGNOSIS — R92.8 ABNORMAL MAMMOGRAM: ICD-10-CM

## 2021-05-13 PROCEDURE — 19081 BX BREAST 1ST LESION STRTCTC: CPT

## 2021-05-13 PROCEDURE — 88342 IMHCHEM/IMCYTCHM 1ST ANTB: CPT | Performed by: PATHOLOGY

## 2021-05-13 PROCEDURE — 88305 TISSUE EXAM BY PATHOLOGIST: CPT | Performed by: PATHOLOGY

## 2021-05-13 PROCEDURE — A4648 IMPLANTABLE TISSUE MARKER: HCPCS

## 2021-05-13 RX ORDER — LIDOCAINE HYDROCHLORIDE AND EPINEPHRINE 10; 10 MG/ML; UG/ML
10 INJECTION, SOLUTION INFILTRATION; PERINEURAL ONCE
Status: COMPLETED | OUTPATIENT
Start: 2021-05-13 | End: 2021-05-13

## 2021-05-13 RX ORDER — LIDOCAINE HYDROCHLORIDE 10 MG/ML
5 INJECTION, SOLUTION EPIDURAL; INFILTRATION; INTRACAUDAL; PERINEURAL ONCE
Status: COMPLETED | OUTPATIENT
Start: 2021-05-13 | End: 2021-05-13

## 2021-05-13 RX ADMIN — LIDOCAINE HYDROCHLORIDE 5 ML: 10 INJECTION, SOLUTION EPIDURAL; INFILTRATION; INTRACAUDAL; PERINEURAL at 10:34

## 2021-05-13 RX ADMIN — LIDOCAINE HYDROCHLORIDE,EPINEPHRINE BITARTRATE 10 ML: 10; .01 INJECTION, SOLUTION INFILTRATION; PERINEURAL at 10:34

## 2021-05-13 NOTE — DISCHARGE INSTR - OTHER ORDERS
POST LARGE CORE BREAST BIOPSY PATIENT INFORMATION      1  Place an ice pack inside your bra over the top of the dressing every hour for 20 minutes (20 minutes on, 60 minutes off)  Do this until bedtime  2  Do not shower or bathe until the following morning  3  You may bathe your breast carefully with the steri-strips in place  Be careful    Not to loosen them  The steri-strips will fall off in 3-5 days  4  You may have mild discomfort, and you may have some bruising where the   Needle entered the skin  This should clear within 5-7 days  5  If you need medicine for discomfort, take acetaminophen products such as   Tylenol  You may also take Advil or Motrin products  6  Do not participate in strenuous activities such as-tennis, aerobics, skiing,  Weight lifting, etc  for 24 hours  Refrain from swimming/soaking for 72 hours  7  Wearing a bra for sleeping may be more comfortable for the first 24-48 hours  8  Watch for continued bleeding, pain or fever over 101; please call with any questions or concerns  For procedures done at the [facility] call:  Dony Juares RN at [facility] RN at [phone]                    *After 4 PM call the Interventional Radiology Department                    [phone] and ask to speak with the nurse on call  For procedures done at the [address] [place] [address] call:         Deya Olmos RN at   *After 4 PM call the Interventional Radiology Department   [phone] and ask to speak with the nurse on call  For procedures done at [address] [place] [address] call: The Radiology Nurse at [phone]  *After 4 PM call your physician, or go to the Emergency Department  9          The final results of your biopsy are usually available within one week

## 2021-05-13 NOTE — PROGRESS NOTES
Procedure type:    _____ultrasound guided ___X__stereotactic    Breast:    _____Left __X___Right    Location: 11 o'clock 11 cm Fn    Needle: 8ga Revovle    # of passes: 3 cores with calcs    Clip: Triple twist    Performed by: Dr Valdez Makesheri    Pressure held for 5 minutes by:  Jayjay Ford)    Steri Strips:    ___X__yes _____no    Band aid:    ___X__yes_____no    Tape and guaze:    ___X__yes _____no    Tolerated procedure:    ____X_yes _____no

## 2021-05-13 NOTE — PROGRESS NOTES
Ice pack given:    ____X_yes _____no    Discharge instructions signed by patient:    ___X__yes _____no    Discharge instructions given to patient:    ___X__yes _____no    Discharged via:    ___X__ambulatory    _____wheelchair    _____stretcher    Stable on discharge:    ____X_yes ____no

## 2021-05-14 NOTE — PROGRESS NOTES
Post procedure call completed 5/14/21 at 1201    Bleeding: _____yes __X___no    Pain: _____yes ___X___no    Redness/Swelling: ______yes ___X___no    Band aid removed: __X___yes _____no    Steri-Strips intact: ___X___yes _____no (discussed with patient to remove steri strips on day 5 if they have not come off on their own)    Pt with no questions at this time, adv will call when results available, adv to call with any questions or concerns, has name/# for contact

## 2021-05-17 ENCOUNTER — TELEPHONE (OUTPATIENT)
Dept: MAMMOGRAPHY | Facility: CLINIC | Age: 56
End: 2021-05-17

## 2021-05-18 ENCOUNTER — TELEPHONE (OUTPATIENT)
Dept: MAMMOGRAPHY | Facility: CLINIC | Age: 56
End: 2021-05-18

## 2021-09-29 ENCOUNTER — TELEPHONE (OUTPATIENT)
Dept: ENDOCRINOLOGY | Facility: HOSPITAL | Age: 56
End: 2021-09-29

## 2021-10-19 DIAGNOSIS — E03.9 HYPOTHYROIDISM, UNSPECIFIED TYPE: ICD-10-CM

## 2021-10-19 RX ORDER — LEVOTHYROXINE SODIUM 75 MCG
75 TABLET ORAL DAILY
Qty: 30 TABLET | Refills: 2 | Status: SHIPPED | OUTPATIENT
Start: 2021-10-19 | End: 2021-12-06 | Stop reason: SDUPTHER

## 2021-11-20 LAB
T4 FREE SERPL-MCNC: 1.48 NG/DL (ref 0.82–1.77)
TSH SERPL DL<=0.005 MIU/L-ACNC: 0.83 UIU/ML (ref 0.45–4.5)

## 2021-12-06 ENCOUNTER — OFFICE VISIT (OUTPATIENT)
Dept: ENDOCRINOLOGY | Facility: HOSPITAL | Age: 56
End: 2021-12-06
Payer: COMMERCIAL

## 2021-12-06 VITALS
BODY MASS INDEX: 31.58 KG/M2 | WEIGHT: 213.2 LBS | HEART RATE: 76 BPM | SYSTOLIC BLOOD PRESSURE: 132 MMHG | DIASTOLIC BLOOD PRESSURE: 80 MMHG | HEIGHT: 69 IN

## 2021-12-06 DIAGNOSIS — E06.3 HYPOTHYROIDISM DUE TO HASHIMOTO'S THYROIDITIS: Primary | ICD-10-CM

## 2021-12-06 DIAGNOSIS — E03.8 HYPOTHYROIDISM DUE TO HASHIMOTO'S THYROIDITIS: Primary | ICD-10-CM

## 2021-12-06 DIAGNOSIS — E04.2 GOITER, NONTOXIC, MULTINODULAR: ICD-10-CM

## 2021-12-06 DIAGNOSIS — E03.9 HYPOTHYROIDISM, UNSPECIFIED TYPE: ICD-10-CM

## 2021-12-06 PROCEDURE — 99214 OFFICE O/P EST MOD 30 MIN: CPT | Performed by: INTERNAL MEDICINE

## 2021-12-06 RX ORDER — LEVOTHYROXINE SODIUM 75 MCG
75 TABLET ORAL DAILY
Qty: 30 TABLET | Refills: 11 | Status: SHIPPED | OUTPATIENT
Start: 2021-12-06

## 2022-05-10 ENCOUNTER — HOSPITAL ENCOUNTER (OUTPATIENT)
Dept: MAMMOGRAPHY | Facility: IMAGING CENTER | Age: 57
Discharge: HOME | End: 2022-05-10
Payer: COMMERCIAL

## 2022-05-10 VITALS — WEIGHT: 210 LBS | BODY MASS INDEX: 30.06 KG/M2 | HEIGHT: 70 IN

## 2022-05-10 DIAGNOSIS — Z12.31 ENCOUNTER FOR SCREENING MAMMOGRAM FOR MALIGNANT NEOPLASM OF BREAST: ICD-10-CM

## 2022-05-10 PROCEDURE — 77067 SCR MAMMO BI INCL CAD: CPT

## 2022-05-10 PROCEDURE — 77063 BREAST TOMOSYNTHESIS BI: CPT

## 2022-06-20 ENCOUNTER — TELEPHONE (OUTPATIENT)
Dept: FAMILY MEDICINE CLINIC | Facility: HOSPITAL | Age: 57
End: 2022-06-20

## 2022-08-05 ENCOUNTER — OFFICE VISIT (OUTPATIENT)
Dept: FAMILY MEDICINE CLINIC | Facility: HOSPITAL | Age: 57
End: 2022-08-05
Payer: COMMERCIAL

## 2022-08-05 ENCOUNTER — TELEPHONE (OUTPATIENT)
Dept: ADMINISTRATIVE | Facility: OTHER | Age: 57
End: 2022-08-05

## 2022-08-05 VITALS
BODY MASS INDEX: 31.55 KG/M2 | SYSTOLIC BLOOD PRESSURE: 130 MMHG | HEART RATE: 76 BPM | DIASTOLIC BLOOD PRESSURE: 84 MMHG | TEMPERATURE: 98.8 F | WEIGHT: 213 LBS | HEIGHT: 69 IN

## 2022-08-05 DIAGNOSIS — M79.671 RIGHT FOOT PAIN: ICD-10-CM

## 2022-08-05 DIAGNOSIS — Z12.83 SKIN CANCER SCREENING: ICD-10-CM

## 2022-08-05 DIAGNOSIS — E66.9 OBESITY (BMI 30.0-34.9): ICD-10-CM

## 2022-08-05 DIAGNOSIS — Z00.00 ANNUAL PHYSICAL EXAM: Primary | ICD-10-CM

## 2022-08-05 DIAGNOSIS — R40.0 DAYTIME SOMNOLENCE: ICD-10-CM

## 2022-08-05 DIAGNOSIS — R06.83 SNORING: ICD-10-CM

## 2022-08-05 PROCEDURE — 99386 PREV VISIT NEW AGE 40-64: CPT | Performed by: INTERNAL MEDICINE

## 2022-08-05 PROCEDURE — 3725F SCREEN DEPRESSION PERFORMED: CPT | Performed by: INTERNAL MEDICINE

## 2022-08-05 RX ORDER — ESTRADIOL 0.1 MG/G
0.5 CREAM VAGINAL 2 TIMES WEEKLY
COMMUNITY
Start: 2022-05-16 | End: 2023-05-16

## 2022-08-05 NOTE — TELEPHONE ENCOUNTER
Upon review of the In Basket request we were able to locate, review, and update the patient chart as requested for Pap Smear (HPV) aka Cervical Cancer Screening  Any additional questions or concerns should be emailed to the Practice Liaisons via [email or web address] email, please do not reply via In Basket      Thank you  Tricia Vicente MA

## 2022-08-05 NOTE — PROGRESS NOTES
[facility] [address]     NAME: Christian King  AGE: 56 y o  SEX: female  : 1965     DATE: 2022     Assessment and Plan:     Problem List Items Addressed This Visit    None     Visit Diagnoses     Annual physical exam    -  Primary    Relevant Orders    CBC and differential    Comprehensive metabolic panel    Lipid panel    Obesity (BMI 30 0-34  9)        Relevant Orders    CBC and differential    Comprehensive metabolic panel    Lipid panel    BMI 31 0-31 9,adult        Relevant Orders    CBC and differential    Comprehensive metabolic panel    Lipid panel    Snoring        order for sleep study given, diet/exercise/wgt loss encouraged, will follow    Relevant Orders    CBC and differential    Comprehensive metabolic panel    Lipid panel    Home Study    Daytime somnolence        Needs eval for sleep apnea and BW - orders given, diet/exercise/wgt loss encouraged, will follow    Relevant Orders    CBC and differential    Comprehensive metabolic panel    Lipid panel    Home Study    Skin cancer screening        Relevant Orders    Ambulatory Referral to Dermatology    Right foot pain        chronic for 5 yrs, now with L foot pain as well, requesting Podiatry referral - referral placed    Relevant Orders    Ambulatory Referral to Podiatry          Immunizations and preventive care screenings were discussed with patient today  Appropriate education was printed on patient's after visit summary  Counseling:  Alcohol/drug use: discussed moderation in alcohol intake, the recommendations for healthy alcohol use, and avoidance of illicit drug use  Dental Health: discussed importance of regular tooth brushing, flossing, and dental visits  Injury prevention: discussed safety/seat belts, safety helmets, smoke detectors, carbon dioxide detectors, and smoking near bedding or upholstery    · Exercise: the importance of regular exercise/physical activity was discussed  Recommend exercise 3-5 times per week for at least 30 minutes  · Cervical cancer screening: PAP 5/22  · Breast cancer screening: mammo 5/22  · Colon cancer screening: Colonoscopy 12/15 - 10 yrs    BMI Counseling: Body mass index is 31 45 kg/m²  The BMI is above normal  Nutrition recommendations include decreasing portion sizes, encouraging healthy choices of fruits and vegetables, limiting drinks that contain sugar, moderation in carbohydrate intake, increasing intake of lean protein, reducing intake of saturated and trans fat and reducing intake of cholesterol  Exercise recommendations include moderate physical activity 150 minutes/week and exercising 3-5 times per week  No pharmacotherapy was ordered  Rationale for BMI follow-up plan is due to patient being overweight or obese  Depression Screening and Follow-up Plan: Patient was screened for depression during today's encounter  They screened negative with a PHQ-2 score of 0  Return in about 1 year (around 8/7/2023) for Annual physical      Chief Complaint:     Chief Complaint   Patient presents with    Physical Exam      History of Present Illness:     Adult Annual Physical   Patient here for a comprehensive physical exam  The patient reports no problems  Diet and Physical Activity  · Diet/Nutrition: eats alot of fruit and has moved to a plant based diet  · Exercise: walking and walks 3-6 miles depending on weather - 7 days a week if weather is good  · BMI reviewed - wgt down 27 lbs since 2018 since going to plant based diet and walking     Depression Screening  PHQ-2/9 Depression Screening    Little interest or pleasure in doing things: 0 - not at all  Feeling down, depressed, or hopeless: 0 - not at all  PHQ-2 Score: 0  PHQ-2 Interpretation: Negative depression screen       General Health  · Sleep: sleeps poorly and has issues falling asleep and staying asleep     states she snores very loudly  She does not wake feeling resting and has a lot of daytime fatigue  · Hearing: normal - bilateral   · Vision: vision problems: has some issues with floaters, goes for regular eye exams and wears glasses  [facility]s with [facility]  · Dental: no dental visits for >1 year, brushes teeth twice daily and flosses teeth occasionally  /GYN Health  · Patient is: postmenopausal  · Last menstrual period: postmenopausal  · Contraceptive method: postmenopausal   · PAP 5/22  · Mammo 5/22     Review of Systems:     Review of Systems   Constitutional: Positive for fatigue  Negative for chills and fever  HENT: Positive for hearing loss  Negative for congestion and trouble swallowing  Eyes: Negative for pain and visual disturbance  Respiratory: Positive for shortness of breath  Negative for cough and wheezing  Cardiovascular: Positive for palpitations  Negative for chest pain and leg swelling  Gastrointestinal: Positive for constipation and diarrhea  Negative for abdominal pain, blood in stool, nausea and vomiting  Endocrine: Negative for polydipsia and polyuria  Genitourinary: Negative for difficulty urinating, dysuria, vaginal bleeding and vaginal pain  Musculoskeletal: Positive for arthralgias and back pain  Skin: Negative for rash and wound  Neurological: Positive for dizziness and headaches  Negative for light-headedness  Hematological: Bruises/bleeds easily  Psychiatric/Behavioral: Positive for sleep disturbance  Negative for dysphoric mood        Past Medical History:     Past Medical History:   Diagnosis Date    Disc displacement, thoracic     T6    GERD (gastroesophageal reflux disease)     Hashimoto's thyroiditis     Herniated cervical disc     Paresthesia     Thyroid nodule       Past Surgical History:     Past Surgical History:   Procedure Laterality Date    BREAST CYST EXCISION Right 2021    Benign    COLONOSCOPY  12/28/2015    Rectal polyp removed- hyperplastic polyp; repeat in 10 years     DENTAL SURGERY      teeth extractions, wisdom teeth    ESOPHAGEAL DILATION      ESOPHAGOGASTRODUODENOSCOPY  04/25/2016    KNEE ARTHROSCOPY Left     MAMMO STEREOTACTIC BREAST BIOPSY RIGHT (ALL INC) Right 5/13/2021      Social History:     Social History     Socioeconomic History    Marital status: /Civil Union     Spouse name: None    Number of children: None    Years of education: None    Highest education level: None   Occupational History    Occupation:    Tobacco Use    Smoking status: Never Smoker    Smokeless tobacco: Never Used   Vaping Use    Vaping Use: Never used   Substance and Sexual Activity    Alcohol use: Yes     Comment: social     Drug use: Not Currently    Sexual activity: None   Other Topics Concern    None   Social History Narrative    None     Social Determinants of Health     Financial Resource Strain: Not on file   Food Insecurity: Not on file   Transportation Needs: Not on file   Physical Activity: Not on file   Stress: Not on file   Social Connections: Not on file   Intimate Partner Violence: Not on file   Housing Stability: Not on file      Family History:     Family History   Problem Relation Age of Onset    Atrial fibrillation Mother     Diabetes type II Mother     Skin cancer Father         63's    Colonic polyp Father     Prostate cancer Father 61    Arrhythmia Family     Thyroid disease Family     Colonic polyp Family     Lupus Family     Cervical cancer Paternal Aunt 61    Mitral valve prolapse Sister     Skin cancer Brother         52's    Skin cancer Sister         42's    No Known Problems Maternal Grandmother     No Known Problems Maternal Grandfather     No Known Problems Paternal Grandmother     No Known Problems Paternal Grandfather     No Known Problems Paternal Aunt     No Known Problems Paternal Aunt     No Known Problems Paternal Aunt     No Known Problems Maternal Aunt     No Known Problems Maternal Aunt     No Known Problems Maternal Aunt       Current Medications:     Current Outpatient Medications   Medication Sig Dispense Refill    estradiol (ESTRACE) 0 1 mg/g vaginal cream Insert 0 5 g into the vagina 2 (two) times a week      Biotin 1 MG CAPS Take by mouth daily      cholecalciferol (VITAMIN D3) 1,000 units tablet Take 1 tablet by mouth daily      cyanocobalamin (VITAMIN B-12) 1,000 mcg tablet Take 1,000 mcg by mouth daily      ferrous sulfate (IRON SUPPLEMENT) 325 (65 Fe) mg tablet Take 1 tablet by mouth daily prn      folic acid (FOLVITE) 1 mg tablet Take 1 tablet (1 mg total) by mouth daily (Patient taking differently: Take 1 mg by mouth daily prn) 30 tablet 0    Lysine 1000 MG TABS Take 1 tablet by mouth daily      magnesium oxide (MAG-OX) 400 mg Take 1 tablet (400 mg total) by mouth daily for 30 doses 30 tablet 0    Multiple Vitamins-Minerals (ZINC PO) Take by mouth prn       Synthroid 75 MCG tablet Take 1 tablet (75 mcg total) by mouth daily 30 tablet 11     No current facility-administered medications for this visit  Allergies: Allergies   Allergen Reactions    Penicillin G Benzathine Other (See Comments)     Childhood reaction    Penicillins Other (See Comments) and Hives     Childhood reaction  Childhood reaction  Childhood reaction        Physical Exam:     /84   Pulse 76   Temp 98 8 °F (37 1 °C) (Tympanic)   Ht 5' 9" (1 753 m)   Wt 96 6 kg (213 lb)   BMI 31 45 kg/m²     Physical Exam  Vitals and nursing note reviewed  Constitutional:       General: She is not in acute distress  Appearance: She is well-developed  She is obese  She is not ill-appearing  HENT:      Head: Normocephalic and atraumatic  Right Ear: Tympanic membrane and external ear normal       Left Ear: Tympanic membrane and external ear normal    Eyes:      General:         Right eye: No discharge  Left eye: No discharge        Conjunctiva/sclera: Conjunctivae normal  Neck:      Thyroid: No thyromegaly  Vascular: No carotid bruit  Trachea: No tracheal deviation  Cardiovascular:      Rate and Rhythm: Normal rate and regular rhythm  Heart sounds: Normal heart sounds  No murmur heard  Pulmonary:      Effort: Pulmonary effort is normal  No respiratory distress  Breath sounds: Normal breath sounds  No wheezing, rhonchi or rales  Abdominal:      General: There is no distension  Palpations: Abdomen is soft  Tenderness: There is no abdominal tenderness  There is no guarding or rebound  Musculoskeletal:         General: No deformity or signs of injury  Cervical back: Neck supple  Lymphadenopathy:      Cervical: No cervical adenopathy  Skin:     General: Skin is warm and dry  Coloration: Skin is not pale  Findings: No rash  Neurological:      General: No focal deficit present  Mental Status: She is alert  Motor: No abnormal muscle tone  Gait: Gait normal    Psychiatric:         Mood and Affect: Mood normal          Behavior: Behavior normal          Thought Content:  Thought content normal          Judgment: Judgment normal           DO Marissa Aguirre [phone]

## 2022-08-05 NOTE — TELEPHONE ENCOUNTER
----- Message from Ruddy Cheung MA sent at 8/5/2022  8:09 AM EDT -----  Regarding: Pap  08/05/22 8:09 AM    Hello, our patient Melissa Casillas has had Pap Smear (HPV) aka Cervical Cancer Screening completed/performed  Please assist in updating the patient chart by pulling the document from the Media Tab  The date of service is 05/16/22       Thank you,  Ruddy Cheung MA  [facility] [address]

## 2022-08-05 NOTE — PATIENT INSTRUCTIONS
Wellness Visit for Adults   AMBULATORY CARE:   A wellness visit  is when you see your healthcare provider to get screened for health problems  Your healthcare provider will also give you advice on how to stay healthy  Write down your questions so you remember to ask them  Ask your healthcare provider how often you should have a wellness visit  What happens at a wellness visit:  Your healthcare provider will ask about your health, and your family history of health problems  This includes high blood pressure, heart disease, and cancer  He or she will ask if you have symptoms that concern you, if you smoke, and about your mood  You may also be asked about your intake of medicines, supplements, food, and alcohol  Any of the following may be done:  · Your weight  will be checked  Your height may also be checked so your body mass index (BMI) can be calculated  Your BMI shows if you are at a healthy weight  · Your blood pressure  and heart rate will be checked  Your temperature may also be checked  · Blood and urine tests  may be done  Blood tests may be done to check your cholesterol levels  Abnormal cholesterol levels increase your risk for heart disease and stroke  You may also need a blood or urine test to check for diabetes if you are at increased risk  Urine tests may be done to look for signs of an infection or kidney disease  · A physical exam  includes checking your heartbeat and lungs with a stethoscope  Your healthcare provider may also check your skin to look for sun damage  · Screening tests  may be recommended  A screening test is done to check for diseases that may not cause symptoms  The screening tests you may need depend on your age, gender, family history, and lifestyle habits  For example, colorectal screening may be recommended if you are 48years old or older  Screening tests you need if you are a woman:   · A Pap smear  is used to screen for cervical cancer   Pap smears are usually done every 3 to 5 years depending on your age  You may need them more often if you have had abnormal Pap smear test results in the past  Ask your healthcare provider how often you should have a Pap smear  · A mammogram  is an x-ray of your breasts to screen for breast cancer  Experts recommend mammograms every 2 years starting at age 48 years  You may need a mammogram at age 52 years or younger if you have an increased risk for breast cancer  Talk to your healthcare provider about when you should start having mammograms and how often you need them  Vaccines you may need:   · Get an influenza vaccine  every year  The influenza vaccine protects you from the flu  Several types of viruses cause the flu  The viruses change over time, so new vaccines are made each year  · Get a tetanus-diphtheria (Td) booster vaccine  every 10 years  This vaccine protects you against tetanus and diphtheria  Tetanus is a severe infection that may cause painful muscle spasms and lockjaw  Diphtheria is a severe bacterial infection that causes a thick covering in the back of your mouth and throat  · Get a human papillomavirus (HPV) vaccine  if you are female and aged 23 to 32 or male 23 to 24 and never received it  This vaccine protects you from HPV infection  HPV is the most common infection spread by sexual contact  HPV may also cause vaginal, penile, and anal cancers  · Get a pneumococcal vaccine  if you are aged 72 years or older  The pneumococcal vaccine is an injection given to protect you from pneumococcal disease  Pneumococcal disease is an infection caused by pneumococcal bacteria  The infection may cause pneumonia, meningitis, or an ear infection  · Get a shingles vaccine  if you are 60 or older, even if you have had shingles before  The shingles vaccine is an injection to protect you from the varicella-zoster virus  This is the same virus that causes chickenpox   Shingles is a painful rash that develops in people who had chickenpox or have been exposed to the virus  How to eat healthy:  My Plate is a model for planning healthy meals  It shows the types and amounts of foods that should go on your plate  Fruits and vegetables make up about half of your plate, and grains and protein make up the other half  A serving of dairy is included on the side of your plate  The amount of calories and serving sizes you need depends on your age, gender, weight, and height  Examples of healthy foods are listed below:  · Eat a variety of vegetables  such as dark green, red, and orange vegetables  You can also include canned vegetables low in sodium (salt) and frozen vegetables without added butter or sauces  · Eat a variety of fresh fruits , canned fruit in 100% juice, frozen fruit, and dried fruit  · Include whole grains  At least half of the grains you eat should be whole grains  Examples include whole-wheat bread, wheat pasta, brown rice, and whole-grain cereals such as oatmeal     · Eat a variety of protein foods such as seafood (fish and shellfish), lean meat, and poultry without skin (turkey and chicken)  Examples of lean meats include pork leg, shoulder, or tenderloin, and beef round, sirloin, tenderloin, and extra lean ground beef  Other protein foods include eggs and egg substitutes, beans, peas, soy products, nuts, and seeds  · Choose low-fat dairy products such as skim or 1% milk or low-fat yogurt, cheese, and cottage cheese  · Limit unhealthy fats  such as butter, hard margarine, and shortening  Exercise:  Exercise at least 30 minutes per day on most days of the week  Some examples of exercise include walking, biking, dancing, and swimming  You can also fit in more physical activity by taking the stairs instead of the elevator or parking farther away from stores  Include muscle strengthening activities 2 days each week  Regular exercise provides many health benefits   It helps you manage your weight, and decreases your risk for type 2 diabetes, heart disease, stroke, and high blood pressure  Exercise can also help improve your mood  Ask your healthcare provider about the best exercise plan for you  General health and safety guidelines:   · Do not smoke  Nicotine and other chemicals in cigarettes and cigars can cause lung damage  Ask your healthcare provider for information if you currently smoke and need help to quit  E-cigarettes or smokeless tobacco still contain nicotine  Talk to your healthcare provider before you use these products  · Limit alcohol  A drink of alcohol is 12 ounces of beer, 5 ounces of wine, or 1½ ounces of liquor  · Lose weight, if needed  Being overweight increases your risk of certain health conditions  These include heart disease, high blood pressure, type 2 diabetes, and certain types of cancer  · Protect your skin  Do not sunbathe or use tanning beds  Use sunscreen with a SPF 15 or higher  Apply sunscreen at least 15 minutes before you go outside  Reapply sunscreen every 2 hours  Wear protective clothing, hats, and sunglasses when you are outside  · Drive safely  Always wear your seatbelt  Make sure everyone in your car wears a seatbelt  A seatbelt can save your life if you are in an accident  Do not use your cell phone when you are driving  This could distract you and cause an accident  Pull over if you need to make a call or send a text message  · Practice safe sex  Use latex condoms if are sexually active and have more than one partner  Your healthcare provider may recommend screening tests for sexually transmitted infections (STIs)  · Wear helmets, lifejackets, and protective gear  Always wear a helmet when you ride a bike or motorcycle, go skiing, or play sports that could cause a head injury  Wear protective equipment when you play sports  Wear a lifejacket when you are on a boat or doing water sports      © Copyright [address] 2022 Information is for End User's use only and may not be sold, redistributed or otherwise used for commercial purposes  All illustrations and images included in CareNotes® are the copyrighted property of [employer] D [employer] M [employer] Inc  or Kelly Baltazar  The above information is an  only  It is not intended as medical advice for individual conditions or treatments  Talk to your doctor, nurse or pharmacist before following any medical regimen to see if it is safe and effective for you  Obesity   AMBULATORY CARE:   Obesity  means your body mass index (BMI) is greater than 30  Your healthcare provider will use your height and weight to measure your BMI  The risks of obesity include  many health problems, including injuries or physical disability  · Diabetes (high blood sugar level)    · High blood pressure or high cholesterol    · Heart disease    · Stroke    · Gallbladder or liver disease    · Cancer of the colon, breast, prostate, liver, or kidney    · Sleep apnea    · Arthritis or gout    Screening  is done to check for health conditions before you have signs or symptoms  If you are 28to 79years old, your blood sugar level may be checked every 3 years for signs of prediabetes or diabetes  Your healthcare provider will check your blood pressure at each visit  High blood pressure can lead to a stroke or other problems  Your provider may check for signs of heart disease, cancer, or other health problems  Seek care immediately if:   · You have a severe headache, confusion, or difficulty speaking  · You have weakness on one side of your body  · You have chest pain, sweating, or shortness of breath  Call your doctor if:   · You have symptoms of gallbladder or liver disease, such as pain in your upper abdomen  · You have knee or hip pain and discomfort while walking  · You have symptoms of diabetes, such as intense hunger and thirst, and frequent urination      · You have symptoms of sleep apnea, such as snoring or daytime sleepiness  · You have questions or concerns about your condition or care  Treatment for obesity  focuses on helping you lose weight to improve your health  Even a small decrease in BMI can reduce the risk for many health problems  Your healthcare provider will help you set a weight-loss goal   · Lifestyle changes  are the first step in treating obesity  These include making healthy food choices and getting regular physical activity  Your healthcare provider may suggest a weight-loss program that involves coaching, education, and therapy  · Medicine  may help you lose weight when it is used with a healthy foods and physical activity  · Surgery  can help you lose weight if you are very obese and have other health problems  There are several types of weight-loss surgery  Ask your healthcare provider for more information  Tips for safe weight loss:   · Set small, realistic goals  An example of a small goal is to walk for 20 minutes 5 days a week  Anther goal is to lose 5% of your body weight  · Tell friends, family members, and coworkers about your goals  and ask for their support  Ask a friend to lose weight with you, or join a weight-loss support group  · Identify foods or triggers that may cause you to overeat , and find ways to avoid them  Remove tempting high-calorie foods from your home and workplace  Place a bowl of fresh fruit on your kitchen counter  If stress causes you to eat, then find other ways to cope with stress  A counselor or therapist may be able to help you  · Keep a diary to track what you eat and drink  Also write down how many minutes of physical activity you do each day  Weigh yourself once a week and record it in your diary  Eating changes: You will need to eat 500 to 1,000 fewer calories each day than you currently eat to lose 1 to 2 pounds a week  The following changes will help you cut calories:  · Eat smaller portions    Use small plates, no larger than 9 inches in diameter  Fill your plate half full of fruits and vegetables  Measure your food using measuring cups until you know what a serving size looks like  · Eat 3 meals and 1 or 2 snacks each day  Plan your meals in advance  [employer] and eat at home most of the time  Eat slowly  Do not skip meals  Skipping meals can lead to overeating later in the day  This can make it harder for you to lose weight  Talk with a dietitian to help you make a meal plan and schedule that is right for you  · Eat fruits and vegetables at every meal   They are low in calories and high in fiber, which makes you feel full  Do not add butter, margarine, or cream sauce to vegetables  Use herbs to season steamed vegetables  · Eat less fat and fewer fried foods  Eat more baked or grilled chicken and fish  These protein sources are lower in calories and fat than red meat  Limit fast food  Dress your salads with olive oil and vinegar instead of bottled dressing  · Limit the amount of sugar you eat  Do not drink sugary beverages  Limit alcohol  Activity changes:  Physical activity is good for your body in many ways  It helps you burn calories and build strong muscles  It decreases stress and depression, and improves your mood  It can also help you sleep better  Talk to your healthcare provider before you begin an exercise program   · Exercise for at least 30 minutes 5 days a week  Start slowly  Set aside time each day for physical activity that you enjoy and that is convenient for you  It is best to do both weight training and an activity that increases your heart rate, such as walking, bicycling, or swimming  · Find ways to be more active  Do yard work and housecleaning  Walk up the stairs instead of using elevators  Spend your leisure time going to events that require walking, such as outdoor festivals or fairs  This extra physical activity can help you lose weight and keep it off         Follow up with your doctor as directed: You may need to meet with a dietitian  Write down your questions so you remember to ask them during your visits  © Copyright [employer] 2022 Information is for End User's use only and may not be sold, redistributed or otherwise used for commercial purposes  All illustrations and images included in CareNotes® are the copyrighted property of A D A M , Inc  or [facility]   The above information is an  only  It is not intended as medical advice for individual conditions or treatments  Talk to your doctor, nurse or pharmacist before following any medical regimen to see if it is safe and effective for you

## 2022-08-11 ENCOUNTER — TELEPHONE (OUTPATIENT)
Dept: SLEEP CENTER | Facility: CLINIC | Age: 57
End: 2022-08-11

## 2022-08-11 NOTE — TELEPHONE ENCOUNTER
----- Message from Fabiana Larsen MD sent at 8/10/2022  3:09 PM EDT -----  Approved    ----- Message -----  From: Clara Awan  Sent: 5/6/4784   9:51 AM EDT  To: [facility] [place] Provider    This home sleep study needs approval      If approved please sign and return to clerical pool  If denied please include reasons why  Also provide alternative testing if warranted  Please sign and return to clerical pool

## 2022-08-15 ENCOUNTER — OFFICE VISIT (OUTPATIENT)
Dept: PODIATRY | Facility: CLINIC | Age: 57
End: 2022-08-15
Payer: COMMERCIAL

## 2022-08-15 VITALS
DIASTOLIC BLOOD PRESSURE: 80 MMHG | SYSTOLIC BLOOD PRESSURE: 122 MMHG | WEIGHT: 212 LBS | HEART RATE: 63 BPM | BODY MASS INDEX: 31.31 KG/M2

## 2022-08-15 DIAGNOSIS — B35.1 ONYCHOMYCOSIS: Primary | ICD-10-CM

## 2022-08-15 DIAGNOSIS — M79.671 RIGHT FOOT PAIN: ICD-10-CM

## 2022-08-15 DIAGNOSIS — G62.9 SENSORY NEUROPATHY: ICD-10-CM

## 2022-08-15 DIAGNOSIS — M51.36 BULGING LUMBAR DISC: ICD-10-CM

## 2022-08-15 PROCEDURE — 99204 OFFICE O/P NEW MOD 45 MIN: CPT | Performed by: PODIATRIST

## 2022-08-15 RX ORDER — UREA 20 %
CREAM (GRAM) TOPICAL AS NEEDED
Qty: 85 G | Refills: 0 | Status: SHIPPED | OUTPATIENT
Start: 2022-08-15

## 2022-08-15 NOTE — PROGRESS NOTES
This patient was seen on 8/15/22  My role is Foot , Ankle, and Wound Specialist    SUBJECTIVE    Chief Complaint:  Multiple foot complaints     Patient ID: Billy Rosas is a 56 y o  female  Kelli Gao is here with multiple foot complaints:    1  Discoloration and thickening of her great toenails  2  Callouses plantar feet  3  Numbness in the feet with history of lumbar disc issues  4  Chronic Left hallux pain with history of prior college injury      The following portions of the patient's history were reviewed and updated as appropriate: allergies, current medications, past family history, past medical history, past social history, past surgical history and problem list     Review of Systems   Constitutional: Negative  Musculoskeletal: Positive for back pain  Skin: Positive for color change  Neurological: Positive for numbness  OBJECTIVE      /80   Pulse 63   Wt 96 2 kg (212 lb)   BMI 31 31 kg/m²     Foot/Ankle Musculoskeletal Exam    General      Neurological: alert    Neurovascular      Neurovascular - Right        Dorsalis pedis: 2+      Posterior tibial: 2+      Neurovascular - Left        Dorsalis pedis: 2+      Posterior tibial: 2+       Physical Exam  Vitals and nursing note reviewed  Constitutional:       General: She is not in acute distress  Appearance: Normal appearance  She is not ill-appearing, toxic-appearing or diaphoretic  HENT:      Head: Normocephalic and atraumatic  Cardiovascular:      Pulses:           Dorsalis pedis pulses are 2+ on the right side and 2+ on the left side  Posterior tibial pulses are 2+ on the right side and 2+ on the left side  Pulmonary:      Effort: Pulmonary effort is normal       Breath sounds: Normal breath sounds  Feet:      Right foot:      Protective Sensation: 10 sites tested  8 sites sensed  Left foot:      Protective Sensation: 10 sites tested  8 sites sensed     Skin:     Comments: I note distal discoloration, subungual debris and thickening of the bilateral hallux nails    I note plantar callosities of both feet  Neurological:      Mental Status: She is alert  ASSESSMENT     Diagnoses and all orders for this visit:    Onychomycosis  -     Hepatic function panel; Future  -     Hepatic function panel  -     urea (Ureacin-20) 20 % cream; Apply topically as needed for dry skin    Right foot pain  Comments:  chronic for 5 yrs, now with L foot pain as well, requesting Podiatry referral - referral placed  Orders:  -     Ambulatory Referral to Podiatry  -     Ambulatory Referral to Pain Management; Future  -     X-ray foot left 3+ views; Future    Sensory neuropathy  -     Ambulatory Referral to Pain Management; Future    Bulging lumbar disc         Problem List Items Addressed This Visit        Nervous and Auditory    Sensory neuropathy    Relevant Orders    Ambulatory Referral to Pain Management       Musculoskeletal and Integument    Bulging lumbar disc    Onychomycosis - Primary    Relevant Medications    urea (Ureacin-20) 20 % cream    Other Relevant Orders    Hepatic function panel      Other Visit Diagnoses     Right foot pain        chronic for 5 yrs, now with L foot pain as well, requesting Podiatry referral - referral placed    Relevant Orders    Ambulatory Referral to Pain Management    X-ray foot left 3+ views              PLAN    Will work her up for potential oral terbinafine therapy for onychomycosis  Hepatic function panel ordered  Will prescribe urea 20% to apply to the calloused areas  Will refer her to Pain Management for evaluation/management of her probably radiculopathy    Xrays ordered of the Left foot  Follow-up here in 4 months (assuming her LFT's were normal and she's finished a 3 month course of terbinafine)

## 2022-08-16 ENCOUNTER — HOSPITAL ENCOUNTER (OUTPATIENT)
Dept: SLEEP CENTER | Facility: HOSPITAL | Age: 57
Discharge: HOME | End: 2022-08-16
Payer: COMMERCIAL

## 2022-08-16 DIAGNOSIS — R40.0 DAYTIME SOMNOLENCE: ICD-10-CM

## 2022-08-16 DIAGNOSIS — R06.83 SNORING: ICD-10-CM

## 2022-08-16 PROCEDURE — G0399 HOME SLEEP TEST/TYPE 3 PORTA: HCPCS

## 2022-08-19 NOTE — PROGRESS NOTES
Home Sleep Study Documentation    HOME STUDY DEVICE: Noxturnal no                                           Marilia G3 yes      Pre-Sleep Home Study:    Set-up and instructions performed by: WILLY Joe RPSGT    Technician performed demonstration for Patient: yes    Return demonstration performed by Patient: yes    Written instructions provided to Patient: yes    Patient signed consent form: yes        Post-Sleep Home Study:    Additional comments by Patient:       Home Sleep Study Failed:no:    Failure reason: N/A    Reported or Detected: N/A    Scored by: WILLY Joe RPSGT

## 2022-08-22 DIAGNOSIS — G47.33 OSA (OBSTRUCTIVE SLEEP APNEA): Primary | ICD-10-CM

## 2022-08-22 PROCEDURE — 95806 SLEEP STUDY UNATT&RESP EFFT: CPT | Performed by: INTERNAL MEDICINE

## 2022-09-02 LAB
ALBUMIN SERPL-MCNC: 4.8 G/DL (ref 3.8–4.9)
ALBUMIN/GLOB SERPL: 2.7 {RATIO} (ref 1.2–2.2)
ALP SERPL-CCNC: 79 IU/L (ref 44–121)
ALT SERPL-CCNC: 9 IU/L (ref 0–32)
AST SERPL-CCNC: 12 IU/L (ref 0–40)
BASOPHILS # BLD AUTO: 0 X10E3/UL (ref 0–0.2)
BASOPHILS NFR BLD AUTO: 1 %
BILIRUB SERPL-MCNC: 0.6 MG/DL (ref 0–1.2)
BUN SERPL-MCNC: 12 MG/DL (ref 6–24)
BUN/CREAT SERPL: 19 (ref 9–23)
CALCIUM SERPL-MCNC: 9.4 MG/DL (ref 8.7–10.2)
CHLORIDE SERPL-SCNC: 103 MMOL/L (ref 96–106)
CHOLEST SERPL-MCNC: 163 MG/DL (ref 100–199)
CHOLEST/HDLC SERPL: 2.1 RATIO (ref 0–4.4)
CO2 SERPL-SCNC: 21 MMOL/L (ref 20–29)
CREAT SERPL-MCNC: 0.63 MG/DL (ref 0.57–1)
EGFR: 105 ML/MIN/1.73
EOSINOPHIL # BLD AUTO: 0 X10E3/UL (ref 0–0.4)
EOSINOPHIL NFR BLD AUTO: 0 %
ERYTHROCYTE [DISTWIDTH] IN BLOOD BY AUTOMATED COUNT: 11.6 % (ref 11.7–15.4)
GLOBULIN SER-MCNC: 1.8 G/DL (ref 1.5–4.5)
GLUCOSE SERPL-MCNC: 93 MG/DL (ref 65–99)
HCT VFR BLD AUTO: 41.1 % (ref 34–46.6)
HDLC SERPL-MCNC: 78 MG/DL
HGB BLD-MCNC: 13.9 G/DL (ref 11.1–15.9)
IMM GRANULOCYTES # BLD: 0 X10E3/UL (ref 0–0.1)
IMM GRANULOCYTES NFR BLD: 0 %
LDLC SERPL CALC-MCNC: 69 MG/DL (ref 0–99)
LYMPHOCYTES # BLD AUTO: 1.2 X10E3/UL (ref 0.7–3.1)
LYMPHOCYTES NFR BLD AUTO: 18 %
MCH RBC QN AUTO: 32.3 PG (ref 26.6–33)
MCHC RBC AUTO-ENTMCNC: 33.8 G/DL (ref 31.5–35.7)
MCV RBC AUTO: 95 FL (ref 79–97)
MONOCYTES # BLD AUTO: 0.4 X10E3/UL (ref 0.1–0.9)
MONOCYTES NFR BLD AUTO: 5 %
NEUTROPHILS # BLD AUTO: 5.1 X10E3/UL (ref 1.4–7)
NEUTROPHILS NFR BLD AUTO: 76 %
PLATELET # BLD AUTO: 220 X10E3/UL (ref 150–450)
POTASSIUM SERPL-SCNC: 3.9 MMOL/L (ref 3.5–5.2)
PROT SERPL-MCNC: 6.6 G/DL (ref 6–8.5)
RBC # BLD AUTO: 4.31 X10E6/UL (ref 3.77–5.28)
SL AMB VLDL CHOLESTEROL CALC: 16 MG/DL (ref 5–40)
SODIUM SERPL-SCNC: 139 MMOL/L (ref 134–144)
TRIGL SERPL-MCNC: 85 MG/DL (ref 0–149)
WBC # BLD AUTO: 6.8 X10E3/UL (ref 3.4–10.8)

## 2022-09-06 ENCOUNTER — CONSULT (OUTPATIENT)
Dept: PAIN MEDICINE | Facility: CLINIC | Age: 57
End: 2022-09-06
Payer: COMMERCIAL

## 2022-09-06 VITALS
HEIGHT: 69 IN | WEIGHT: 211 LBS | HEART RATE: 72 BPM | SYSTOLIC BLOOD PRESSURE: 112 MMHG | TEMPERATURE: 98.2 F | BODY MASS INDEX: 31.25 KG/M2 | DIASTOLIC BLOOD PRESSURE: 72 MMHG

## 2022-09-06 DIAGNOSIS — G89.29 CHRONIC BILATERAL LOW BACK PAIN, UNSPECIFIED WHETHER SCIATICA PRESENT: ICD-10-CM

## 2022-09-06 DIAGNOSIS — R20.0 BILATERAL LEG NUMBNESS: Primary | ICD-10-CM

## 2022-09-06 DIAGNOSIS — M54.2 NECK PAIN: ICD-10-CM

## 2022-09-06 DIAGNOSIS — G89.4 CHRONIC PAIN SYNDROME: ICD-10-CM

## 2022-09-06 DIAGNOSIS — M54.50 CHRONIC BILATERAL LOW BACK PAIN, UNSPECIFIED WHETHER SCIATICA PRESENT: ICD-10-CM

## 2022-09-06 DIAGNOSIS — E06.3 HYPOTHYROIDISM DUE TO HASHIMOTO'S THYROIDITIS: ICD-10-CM

## 2022-09-06 DIAGNOSIS — E03.8 HYPOTHYROIDISM DUE TO HASHIMOTO'S THYROIDITIS: ICD-10-CM

## 2022-09-06 PROCEDURE — 99204 OFFICE O/P NEW MOD 45 MIN: CPT | Performed by: ANESTHESIOLOGY
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
## 2022-09-29 ENCOUNTER — TELEPHONE (OUTPATIENT)
Dept: PULMONOLOGY | Facility: HOSPITAL | Age: 57
End: 2022-09-29

## 2022-09-29 ENCOUNTER — DOCUMENTATION (OUTPATIENT)
Dept: PULMONOLOGY | Facility: HOSPITAL | Age: 57
End: 2022-09-29

## 2022-09-29 ENCOUNTER — CONSULT (OUTPATIENT)
Dept: PULMONOLOGY | Facility: HOSPITAL | Age: 57
End: 2022-09-29
Payer: COMMERCIAL

## 2022-09-29 VITALS
TEMPERATURE: 97.5 F | HEIGHT: 69 IN | RESPIRATION RATE: 18 BRPM | DIASTOLIC BLOOD PRESSURE: 80 MMHG | OXYGEN SATURATION: 99 % | BODY MASS INDEX: 31.4 KG/M2 | SYSTOLIC BLOOD PRESSURE: 122 MMHG | WEIGHT: 212 LBS | HEART RATE: 70 BPM

## 2022-09-29 DIAGNOSIS — G47.33 OSA (OBSTRUCTIVE SLEEP APNEA): Primary | ICD-10-CM

## 2022-09-29 DIAGNOSIS — G25.81 RESTLESS LEGS SYNDROME: ICD-10-CM

## 2022-09-29 DIAGNOSIS — G47.19 EXCESSIVE DAYTIME SLEEPINESS: ICD-10-CM

## 2022-09-29 DIAGNOSIS — R06.83 SNORING: ICD-10-CM

## 2022-09-29 LAB

## 2022-09-29 PROCEDURE — 99204 OFFICE O/P NEW MOD 45 MIN: CPT | Performed by: INTERNAL MEDICINE

## 2022-09-29 NOTE — PATIENT INSTRUCTIONS
Sleep Apnea   AMBULATORY CARE:   Sleep apnea  is a condition that causes you to stop breathing often during sleep  Types of sleep apnea:   Obstructive sleep apnea (COLUMBA)  is the most common kind  The muscles and tissues around your throat relax and block air from passing through  Obesity, use of alcohol or cigarettes, or a family history are common causes  COLUMBA may increase your risk for complications after surgery  Central sleep apnea (CSA)  means your brain does not send signals to the muscles that control breathing  You do not take a breath even though your airway is open  Common causes include medical conditions such as heart failure, being older than 40, or use of opioids  Complex (or mixed) sleep apnea  means you have both obstructive and central sleep apnea  Common signs and symptoms:   Loud snoring or long pauses in breathing    Feeling sleepy, slow, and tired during the day    Snorting, gasping, or choking while you sleep, and waking up suddenly because of these    Feeling irritable during the day    Dry mouth or a headache in the mornings    Heavy night sweating    A hard time thinking, remembering things, or focusing on your tasks the following day    Call your local emergency number (911 in the [address] [facility]) if:   You have chest pain or trouble breathing  Call your doctor if:   You have new or worsening signs or symptoms  You have questions or concerns about your condition or care  Treatment  depends on the kind of apnea you have  A mouth device  may be needed if you have mild sleep apnea  These are designed to keep your throat open  Ask your dentist or healthcare provider about the best mouth device for you  A machine  may be used to help you get more air during sleep  A mask may be placed over your nose and mouth, or just your nose  The mask is hooked to the machine  You will get air through the mask      A continuous positive airway pressure (CPAP) machine  is used to keep your airway open during sleep  The machine blows a gentle stream of air into the mask when you breathe  This helps keep your airway open so you can breathe more regularly  Extra oxygen may be given through the machine  A bilevel positive airway pressure (BiPAP) machine  gives air but lowers the pressure when you breathe out  An adaptive servo-ventilator (ASV)  is a machine that learns your usual breathing pattern  Then, it uses pressure to give you air and prevent stops in your breathing  Surgery  to expand your airway or remove extra tissues may be needed  Surgery is usually only considered if other treatments do not work  Manage or prevent sleep apnea:   Reach and maintain a healthy weight  Ask your healthcare provider what a healthy weight is for you  Ask him or her to help you create a safe weight loss plan if you are overweight  Even a small goal of a 10% weight loss can improve your symptoms  Do not smoke  Nicotine and other chemicals in cigarettes and cigars can cause lung damage  Ask your healthcare provider for information if you currently smoke and need help to quit  E-cigarettes or smokeless tobacco still contain nicotine  Talk to your healthcare provider before you use these products  Do not drink alcohol or take sedative medicine before you go to sleep  Alcohol and sedatives can relax the muscles and tissues around your throat  This can block the airflow to your lungs  Sleep on your side or use pillows designed to prevent sleep apnea  This prevents your tongue or other tissues from blocking your throat  You can also raise the head of your bed  Follow up with your doctor or specialist as directed: You may need to have blood tests during your follow-up visits  Work with your provider to find the right breathing support equipment and settings for you  Write down your questions so you remember to ask them during your visits    © Copyright [address] 2022 Information is for End User's use only and may not be sold, redistributed or otherwise used for commercial purposes  All illustrations and images included in CareNotes® are the copyrighted property of A D A M [employer]  or Kelly Baltazar  The above information is an  only  It is not intended as medical advice for individual conditions or treatments  Talk to your doctor, nurse or pharmacist before following any medical regimen to see if it is safe and effective for you

## 2022-09-29 NOTE — ASSESSMENT & PLAN NOTE
· Sara Del Valle had a recent sleep study at home that found to have AHI of 18 6 events per hour, consistent with moderate obstructive sleep apnea  · She has classic symptoms, loud snoring, fatigue, excessive daytime sleepiness  · I would like to start her on a trial of auto CPAP with pressure range of 5-20 cm H2O  · I explained to the patient in details the pathophysiology of obstructive sleep apnea  I also explained the importance of treatment, and the consequences of untreated obstructive sleep apnea on underlying cardiovascular and cerebrovascular risk, morbidity, and mortality

## 2022-09-29 NOTE — TELEPHONE ENCOUNTER
Pt stated that she would prefer going to the [facility] office in [facility] instead of our [facility] office for her mask fitting so that she didn't have to take half day from work to do that  So I put her on the recall list for Jan 23 to follow up for compliance

## 2022-09-29 NOTE — PROGRESS NOTES
Sleep Consultation   Lia Rico 56 y o  female MRN: [id number]      Reason for consultation: Moderate COLUMBA    Requesting physician: Dr Kenny Valencia     Assessment/Plan  1  COLUMBA (obstructive sleep apnea)  Assessment & Plan:  · Cass Samuels had a recent sleep study at home that found to have AHI of 18 6 events per hour, consistent with moderate obstructive sleep apnea  · She has classic symptoms, loud snoring, fatigue, excessive daytime sleepiness  · I would like to start her on a trial of auto CPAP with pressure range of 5-20 cm H2O  · I explained to the patient in details the pathophysiology of obstructive sleep apnea  I also explained the importance of treatment, and the consequences of untreated obstructive sleep apnea on underlying cardiovascular and cerebrovascular risk, morbidity, and mortality  Orders:  -     Ambulatory Referral to Sleep Medicine  -     CPAP Auto New DME    2  Snoring  -     CPAP Auto New DME    3  Restless legs syndrome  -     CPAP Auto New DME    4  Excessive daytime sleepiness  -     CPAP Auto New DME          History of Present Illness   HPI:  Lia Rico is a 56 y o  female with PMHx as below who comes in for evaluation of obstructive sleep apnea  The patient works as a  she tells me that she has significant family history of obstructive sleep apnea she has excessive daytime sleepiness, fatigue and she has very loud snoring she underwent a home sleep study recently ordered by primary care physician found to have moderate COLUMBA with AHI of 18 6 events per hour  Her sleep routine is going to bed around 11:00 p m  Falls asleep variably and she wakes up around 7:45 a m   And she has variable awakenings overnight with no significant or specific cause however she would feel very tired upon awakening and in the mid day and she would take a nap if she is given an opportunity in the weekends she has frequent daytime headache, teeth grinding, chronic pain, restless leg syndrome and decreased concentration  She denies drinking coffee or energy drinks, denies tobacco alcohol or drug abuse and her Salem Scale is 14/24              Review of Systems      Genitourinary none   Cardiology none   Gastrointestinal none   Neurology frequent headaches, need to move extremities and none   Constitutional fatigue   Integumentary none   Psychiatry none   Musculoskeletal legs twitching/jerking   Pulmonary shortness of breath with activity and snoring   ENT none   Endocrine none   Hematological none               Historical Information   Past Medical History:   Diagnosis Date    Disc displacement, thoracic     T6    GERD (gastroesophageal reflux disease)     Hashimoto's thyroiditis     Herniated cervical disc     Paresthesia     Thyroid nodule      Past Surgical History:   Procedure Laterality Date    BREAST CYST EXCISION Right 2021    Benign    COLONOSCOPY  12/28/2015    Rectal polyp removed- hyperplastic polyp; repeat in 10 years     DENTAL SURGERY      teeth extractions, wisdom teeth    ESOPHAGEAL DILATION      ESOPHAGOGASTRODUODENOSCOPY  04/25/2016    KNEE ARTHROSCOPY Left     MAMMO STEREOTACTIC BREAST BIOPSY RIGHT (ALL INC) Right 5/13/2021     Family History   Problem Relation Age of Onset    Atrial fibrillation Mother     Diabetes type II Mother     Skin cancer Father         63's    Colonic polyp Father     Prostate cancer Father 61    Arrhythmia Family     Thyroid disease Family     Colonic polyp Family     Lupus Family     Cervical cancer Paternal Aunt 61    Mitral valve prolapse Sister     Skin cancer Brother         52's    Skin cancer Sister         42's    No Known Problems Maternal Grandmother     No Known Problems Maternal Grandfather     No Known Problems Paternal Grandmother     No Known Problems Paternal Grandfather     No Known Problems Paternal Aunt     No Known Problems Paternal Aunt     No Known Problems Paternal Aunt     No Known Problems Maternal Aunt     No Known Problems Maternal Aunt     No Known Problems Maternal Aunt      Social History     Socioeconomic History    Marital status: /Civil Union     Spouse name: Not on file    Number of children: Not on file    Years of education: Not on file    Highest education level: Not on file   Occupational History    Occupation:    Tobacco Use    Smoking status: Never Smoker    Smokeless tobacco: Never Used   Vaping Use    Vaping Use: Never used   Substance and Sexual Activity    Alcohol use: Yes     Comment: social     Drug use: Not Currently    Sexual activity: Not on file   Other Topics Concern    Not on file   Social History Narrative    Not on file     Social Determinants of Health     Financial Resource Strain: Not on file   Food Insecurity: Not on file   Transportation Needs: Not on file   Physical Activity: Not on file   Stress: Not on file   Social Connections: Not on file   Intimate Partner Violence: Not on file   Housing Stability: Not on file       Occupational History:      Meds/Allergies   Allergies   Allergen Reactions    Penicillin G Benzathine Other (See Comments)     Childhood reaction    Penicillins Other (See Comments) and Hives     Childhood reaction  Childhood reaction  Childhood reaction         Home medications:  Prior to Admission medications    Medication Sig Start Date End Date Taking?  Authorizing Provider   Biotin 1 MG CAPS Take by mouth daily   Yes Historical Provider, MD   cholecalciferol (VITAMIN D3) 1,000 units tablet Take 1 tablet by mouth daily 6/9/15  Yes Historical Provider, MD   cyanocobalamin (VITAMIN B-12) 1,000 mcg tablet Take 1,000 mcg by mouth daily   Yes Historical Provider, MD   estradiol (ESTRACE) 0 1 mg/g vaginal cream Insert 0 5 g into the vagina 2 (two) times a week 5/16/22 5/16/23 Yes Historical Provider, MD   ferrous sulfate 325 (65 Fe) mg tablet Take 1 tablet by mouth daily prn 6/9/15  Yes Historical Provider, MD   folic acid (FOLVITE) 1 mg tablet Take 1 tablet (1 mg total) by mouth daily  Patient taking differently: Take 1 mg by mouth daily prn 2/5/18  Yes Maddy Cary DO   Lysine 1000 MG TABS Take 1 tablet by mouth daily 6/9/15  Yes Historical Provider, MD   Multiple Vitamins-Minerals (ZINC PO) Take by mouth prn    Yes Historical Provider, MD   Synthroid 75 MCG tablet Take 1 tablet (75 mcg total) by mouth daily 12/6/21  Yes Erick Sánchez MD   urea (Ureacin-20) 20 % cream Apply topically as needed for dry skin 8/15/22  Yes Wendy Sigala DPM   magnesium oxide (MAG-OX) 400 mg Take 1 tablet (400 mg total) by mouth daily for 30 doses 2/5/18 12/6/21  Maddy Cary DO       Vitals:   Blood pressure 122/80, pulse 70, temperature 97 5 °F (36 4 °C), temperature source Tympanic, resp  rate 18, height 5' 9" (1 753 m), weight 96 2 kg (212 lb), SpO2 99 %  ,  Body mass index is 31 31 kg/m²  Neck Circumference: 13 5    Physical Exam  General:  Awake alert and oriented x 3, conversant without conversational dyspnea, NAD, normal affect  HEENT:   Sclera noninjected, nonicteric OU, Nares patent,  no craniofacial abnormalities, Mucous membranes, moist, no oral lesions, normal dentition, Mallampati class 4  NECK:  Trachea midline, no accessory muscle use, no stridor,  JVP not elevated  CARDIAC: Reg, single s1/S2, no m/r/g  PULM: CTA bilaterally no wheezing, rhonchi or rales  ABD: Soft nontender, nondistended, no rebound, no rigidity, no guarding  EXT: No cyanosis, no clubbing, no edema, normal capillary refill  NEURO: no focal neurologic deficits, AAOx3, moving all extremities appropriately    Labs: I have personally reviewed pertinent lab results  , ABG: No results found for: PHART, PHO7MLT, PO2ART, HES8AZA, A7DJFQST, BEART, SOURCE, BNP: No results found for: BNP, CBC: No results found for: WBC, HGB, HCT, MCV, PLT, ADJUSTEDWBC, MCH, MCHC, RDW, MPV, NRBC, CMP: No results found for: SODIUM, K, CL, CO2, ANIONGAP, BUN, CREATININE, GLUCOSE, CALCIUM, AST, ALT, ALKPHOS, PROT, BILITOT, EGFR, PT/INR: No results found for: PT, INR, Troponin: No results found for: TROPONINI  Lab Results   Component Value Date    WBC 6 8 09/01/2022    HGB 13 9 09/01/2022    HCT 41 1 09/01/2022    MCV 95 09/01/2022     09/01/2022      Lab Results   Component Value Date    K 3 9 09/01/2022    CO2 21 09/01/2022     09/01/2022    BUN 12 09/01/2022    CREATININE 0 63 09/01/2022     No results found for: IRON, TIBC, FERRITIN  Lab Results   Component Value Date    GGADTSBZ72 930 09/13/2016     Lab Results   Component Value Date    FOLATE 4 4 09/13/2016         Sleep studies:  HST 2022: AHI 18 6 events/hr --> moderate COLUMBA         Leydi Toscano MD  [facility]       Portions of the record may have been created with voice recognition software  Occasional wrong word or "sound a like" substitutions may have occurred due to the inherent limitations of voice recognition software  Read the chart carefully and recognize, using context, where substitutions have occurred

## 2022-10-04 LAB

## 2022-10-14 LAB

## 2022-10-27 ENCOUNTER — CONSULT (OUTPATIENT)
Dept: DERMATOLOGY | Facility: CLINIC | Age: 57
End: 2022-10-27

## 2022-10-27 VITALS — WEIGHT: 215 LBS | TEMPERATURE: 97.6 F | HEIGHT: 70 IN | BODY MASS INDEX: 30.78 KG/M2

## 2022-10-27 DIAGNOSIS — Z12.83 SKIN CANCER SCREENING: ICD-10-CM

## 2022-10-27 NOTE — PATIENT INSTRUCTIONS
CREENING SKIN EXAM     Assessment and Plan:  Based on a thorough discussion of this condition and the management approach to it (including a comprehensive discussion of the known risks, side effects and potential benefits of treatment), the patient (family) agrees to implement the following specific plan:  When outside we recommend using a wide brim hat, sunglasses, long sleeve and pants, sunscreen with SPF 34+ with reapplication every 2 hours, or SPF specific clothing  Continue with routine skin exams   Discussed Skin Medicinals as additional treatment for pre-cancerous spots

## 2022-11-14 ENCOUNTER — TELEPHONE (OUTPATIENT)
Dept: PODIATRY | Facility: CLINIC | Age: 57
End: 2022-11-14

## 2022-11-14 ENCOUNTER — TELEPHONE (OUTPATIENT)
Dept: FAMILY MEDICINE CLINIC | Facility: HOSPITAL | Age: 57
End: 2022-11-14

## 2022-11-14 DIAGNOSIS — R30.0 DYSURIA: Primary | ICD-10-CM

## 2022-11-14 DIAGNOSIS — B35.1 ONYCHOMYCOSIS: Primary | ICD-10-CM

## 2022-11-14 RX ORDER — TERBINAFINE HYDROCHLORIDE 250 MG/1
250 TABLET ORAL DAILY
Qty: 45 TABLET | Refills: 0 | Status: SHIPPED | OUTPATIENT
Start: 2022-11-14 | End: 2022-12-29

## 2022-11-14 NOTE — TELEPHONE ENCOUNTER
Caller: Lois España    Doctor: Peterson Jordan    Reason for call:  had labwork done 10/27 - Is it ok to start medication? Please advise  Call back#: [phone]

## 2022-11-14 NOTE — TELEPHONE ENCOUNTER
Patient thinks she has a UTI and would like a slip so she can go to [facility] and get check   Painful / frequent urination, burning, when she walks she feels like she has to go to the bathroom     [facility]

## 2022-11-16 ENCOUNTER — TELEPHONE (OUTPATIENT)
Dept: FAMILY MEDICINE CLINIC | Facility: HOSPITAL | Age: 57
End: 2022-11-16

## 2022-11-16 DIAGNOSIS — N30.90 CYSTITIS: Primary | ICD-10-CM

## 2022-11-16 RX ORDER — LEVOFLOXACIN 500 MG/1
500 TABLET, FILM COATED ORAL EVERY 24 HOURS
Qty: 7 TABLET | Refills: 0 | Status: SHIPPED | OUTPATIENT
Start: 2022-11-16 | End: 2022-11-23

## 2022-11-16 NOTE — TELEPHONE ENCOUNTER
Vascular Surgery  Discharge Summary    Patient ID: Charlette Query      Patient's PCP: Rahel Brewster    Admit Date: 8/31/2020     Discharge Date: 09/03/2020    Admitting Physician: Dr. Edmund Butler     Discharge Physician: Edmund Butler DO    Consultants:   None    Operative Procedures:  1. On 08/31/2020, Placement of VBX Aortic Stent with Bilateral Kissing Iliac Stents with Common Femoral Artery Endarterectomy with Vein Patch by Dr. Edmund Butler    Complications:  None    Discharge Diagnoses:    Principal Problem: Aortoiliac occlusive disease (HCC)    Active Problems:    Essential hypertension    Paroxysmal Atrial Fibrillation    Mixed hyperlipidemia    BPH    GERD    Wounds, Left Foot - S/P Sharp Excisional Debridement of Subcutaneous Tissue on 09/03/2020      Hospital Course:   Mr. Lupe Kothari is a 69year old male, followed at [address] [place] [address] by Dr. Claudell Sheen, who was referred to vascular surgery for evaluation of PVD. He underwent an aortogram on 07/14/2020 and was seen in the office for results and to discuss options on 07/22/2020. Surgical intervention was recommended. Risks and benefits were explained and discussed in detail, including the risks and benefits. He voiced understanding and was agreeable to proceed. Patient was seen by Dr. Wendi Romero, Cardiology, who saw him in the office on 07/23/2020, cleared from cardiology standpoint. Patient was admitted on 08/31/2020, taken to the OR, underwent placement of VBX aortic stent with bilateral kissing iliac stents with common femoral artery endarterectomy with vein patch. He tolerated the operation without complication and was admitted to ICU for overnight observation. During the postoperative patient remained hemodynamically stable. Wound care daily. Transferred to the vascular floor on POD 1. Home health consulted and set up. Postoperative MACARENA completed on POD 2. Bedside debridement of left foot wounds per Dr. Devora Lopez on POD 3.  Patient was Would like to talk to a nurse about her test results    PCB up in room, pain controlled. He was felt stable for discharge home. Exam:   Vital Signs: /60   Pulse 72   Temp 98.1 °F (36.7 °C)   Resp 18   Ht 5' 8\" (1.727 m)   Wt 164 lb 6.4 oz (74.6 kg)   SpO2 90%   BMI 25.00 kg/m²   General appearance: No apparent distress, appears stated age and cooperative. HEENT: Normocephalic. Atraumatic. ASIA. Neck: Supple. No JVD. Trachea midline. Respiratory:  Normal respiratory effort. Clear to auscultation bilaterally without rales, wheezes, or rhonchi. Cardiovascular: Regular rate and rhythm with normal heart tones without murmurs, rubs or gallops. Abdomen: Soft, non-tender, non-distended with normal bowel sounds. Extremities: Feet warm to touch, doppler signals bilaterally PT/DP are monophasic  Neurologic:  Grossly intact. Psychiatric: Alert and oriented, thought content appropriate, normal insight. Wounds/Surgical Incisions: Bilateral groin incision lines with Mepilex dressings intact, small amount of bloody drainage noted on right groin dressing. RLE incision line with staples, is CDI. Left foot wounds with Aquacel AG and Mepilex over sites. Activity: Activity as tolerated    Diet: Cardiac    Labs: For convenience and continuity at follow-up the following most recent labs are provided:    CBC:   Recent Labs     08/31/20  1532 09/01/20  0345 09/02/20  0642   WBC 10.5 12.9* 13.3*   HGB 11.5* 9.4* 10.2*   HCT 35.5* 28.8* 31.7*   MCV 98.3* 96.6* 100.6*    178 181     BMP:    Recent Labs     09/01/20  0345 09/02/20  0642    142   K 4.3 3.7    107   CO2 23 25   BUN 15 15   CREATININE 0.7 0.7     LIVER PROFILE:   Recent Labs     09/01/20  0345   AST 13   ALT 9   BILITOT 0.3   ALKPHOS 66         Discharge Medications:     Current Discharge Medication List           Details   HYDROcodone-acetaminophen (NORCO)  MG per tablet Take 1 tablet by mouth every 4 hours as needed for Pain for up to 7 days.   Qty: 20 tablet, Refills: 0 Comments: Reduce doses taken as pain becomes manageable  Associated Diagnoses: Aortoiliac occlusive disease (HCC)      clopidogrel (PLAVIX) 75 MG tablet Take 1 tablet by mouth daily  Qty: 30 tablet, Refills: 3              Details   amiodarone (CORDARONE) 200 MG tablet Take 200 mg by mouth daily       cilostazol (PLETAL) 50 MG tablet Take 50 mg by mouth 2 times daily      dilTIAZem (CARDIZEM CD) 120 MG extended release capsule Take 120 mg by mouth daily      lisinopril (PRINIVIL;ZESTRIL) 40 MG tablet Take 20 mg by mouth daily      pantoprazole (PROTONIX) 20 MG tablet Take 20 mg by mouth daily      vitamin D (CHOLECALCIFEROL) 50 MCG (2000 UT) TABS tablet Take 2,000 Units by mouth daily      aspirin 81 MG EC tablet Take 81 mg by mouth daily      ferrous sulfate (IRON 325) 325 (65 Fe) MG tablet Take 325 mg by mouth daily (with breakfast)      guaiFENesin (MUCINEX) 600 MG extended release tablet Take 600 mg by mouth 2 times daily as needed       hydrOXYzine (ATARAX) 25 MG tablet Take 25 mg by mouth 3 times daily as needed for Anxiety       lidocaine (LMX) 4 % cream Apply topically as needed      famotidine (PEPCID) 20 MG tablet Take 20 mg by mouth daily as needed       finasteride (PROSCAR) 5 MG tablet Take 5 mg by mouth daily               Disposition: Home with Home Health    Discharge Instructions/Follow-up:    1. Appointment with Dr. Laurel Jiménez at [facility] on 09/14/2020 at 11 am for Wound Check. 2. Appointment with JANEY Roa on 09/15/2020 at 11 am for Routine Postoperative Follow-up. Time Spent on discharge is 25 minutes in the examination, evaluation, counseling and review of medications and discharge plan. JANEY Lee      Thank you Vania Lester for the opportunity to be involved in this patient's care. If you have any questions or concerns please feel free to contact me at [phone].

## 2022-11-18 ENCOUNTER — DOCUMENTATION (OUTPATIENT)
Dept: PULMONOLOGY | Facility: MEDICAL CENTER | Age: 57
End: 2022-11-18

## 2022-11-18 ENCOUNTER — OFFICE VISIT (OUTPATIENT)
Dept: PULMONOLOGY | Facility: HOSPITAL | Age: 57
End: 2022-11-18

## 2022-11-18 ENCOUNTER — TELEPHONE (OUTPATIENT)
Dept: FAMILY MEDICINE CLINIC | Facility: HOSPITAL | Age: 57
End: 2022-11-18

## 2022-11-18 VITALS
SYSTOLIC BLOOD PRESSURE: 122 MMHG | DIASTOLIC BLOOD PRESSURE: 70 MMHG | TEMPERATURE: 98.2 F | HEIGHT: 70 IN | OXYGEN SATURATION: 98 % | HEART RATE: 75 BPM | RESPIRATION RATE: 18 BRPM | WEIGHT: 221 LBS | BODY MASS INDEX: 31.64 KG/M2

## 2022-11-18 DIAGNOSIS — G47.19 EXCESSIVE DAYTIME SLEEPINESS: ICD-10-CM

## 2022-11-18 DIAGNOSIS — G47.33 OSA (OBSTRUCTIVE SLEEP APNEA): Primary | ICD-10-CM

## 2022-11-18 DIAGNOSIS — R06.83 SNORING: ICD-10-CM

## 2022-11-18 DIAGNOSIS — E66.9 OBESITY (BMI 30-39.9): ICD-10-CM

## 2022-11-18 LAB
APPEARANCE UR: CLEAR
BACTERIA UR CULT: ABNORMAL
BACTERIA URNS QL MICRO: ABNORMAL
BILIRUB UR QL STRIP: NEGATIVE
CASTS URNS QL MICRO: ABNORMAL /LPF
COLOR UR: YELLOW
DME PARACHUTE DELIVERY DATE REQUESTED: NORMAL
DME PARACHUTE ITEM DESCRIPTION: NORMAL
DME PARACHUTE ORDER STATUS: NORMAL
DME PARACHUTE SUPPLIER NAME: NORMAL
DME PARACHUTE SUPPLIER PHONE: NORMAL
EPI CELLS #/AREA URNS HPF: ABNORMAL /HPF (ref 0–10)
GLUCOSE UR QL: NEGATIVE
HGB UR QL STRIP: ABNORMAL
KETONES UR QL STRIP: NEGATIVE
LEUKOCYTE ESTERASE UR QL STRIP: ABNORMAL
Lab: ABNORMAL
MICRO URNS: ABNORMAL
NITRITE UR QL STRIP: NEGATIVE
PH UR STRIP: 7.5 [PH] (ref 5–7.5)
PROT UR QL STRIP: NEGATIVE
RBC #/AREA URNS HPF: ABNORMAL /HPF (ref 0–2)
SL AMB URINALYSIS REFLEX: ABNORMAL
SP GR UR: 1.01 (ref 1–1.03)
UROBILINOGEN UR STRIP-ACNC: 0.2 MG/DL (ref 0.2–1)
WBC #/AREA URNS HPF: >30 /HPF (ref 0–5)

## 2022-11-18 RX ORDER — ZINC GLUCONATE 50 MG
50 TABLET ORAL DAILY
COMMUNITY

## 2022-11-18 NOTE — TELEPHONE ENCOUNTER
Spoke to [facility], results are still pending but they are faxing over preliminary results

## 2022-11-18 NOTE — PROGRESS NOTES
Assessment & Plan:      1  COLUMBA (obstructive sleep apnea)  CANCELED: PAP DME Pressure Change      2  Obesity (BMI 30-39 9)        3  Excessive daytime sleepiness        4  Snoring            Patient presenting for follow-up  They are using the CPAP and benefitting from it  Reviewed compliance report with the patient demonstrating residual AHI is acceptable and they are compliant with use  Does occasional wake up when pressure ramps up, we discussed potentially making a slight adjustment to help make her more comfortable however patient would like to hold for now and will let us know if she wants a change   Discussed regular cleaning and changing of the supplies  Patient is aware of consequences of untreated sleep apnea including increased risk for cardiac disease and stroke and therefore the need for compliance    Subjective:     Patient ID: Sherrell Strickland is a 56 y o  female  Chief Complaint:    Jasmin Allen is a 45-year-old female presenting for follow-up regarding her COLUMBA  Patient has been using her new CPAP machine for about 1 month  She is still getting used to it but overall doing well with using it regularly  Sometimes, the pressure ramps up at night which causes her to wake up and she feels like there is air leaking from the mask  She notes with using the CPAP she no longer has early morning headaches and has better focus  She denies drinking coffee or energy drinks, denies tobacco alcohol or drug abuse  The following portions of the patient's history were reviewed in this encounter and updated as appropriate: Past medical, social, surgical, family, allergies    Review of Systems   Constitutional: Positive for fatigue  Respiratory: Negative for shortness of breath  Psychiatric/Behavioral: Positive for sleep disturbance  All other systems reviewed and are negative          Objective:  Vitals:    11/18/22 0844 11/18/22 0847   BP: 122/70    BP Location: Left arm    Patient Position: Sitting Cuff Size: Adult    Pulse: 75    Resp: 18    Temp: 98 2 °F (36 8 °C)    TempSrc: Tympanic    SpO2: 98% 98%   Weight: 100 kg (221 lb)    Height: 5' 9 5" (1 765 m)        Physical Exam  Vitals reviewed  Constitutional:       General: She is not in acute distress  Appearance: She is not toxic-appearing  HENT:      Head: Normocephalic and atraumatic  Eyes:      General: No scleral icterus  Cardiovascular:      Rate and Rhythm: Normal rate and regular rhythm  Pulmonary:      Effort: Pulmonary effort is normal       Breath sounds: Normal breath sounds  Musculoskeletal:         General: No deformity  Skin:     General: Skin is warm and dry  Neurological:      General: No focal deficit present  Mental Status: She is alert  Mental status is at baseline     Psychiatric:         Mood and Affect: Mood normal          Behavior: Behavior normal          Lab Review:   Consult on 09/29/2022   Component Date Value   • Supplier Name 09/29/2022 [employer]    • Supplier Phone Number 09/29/2022 [phone]    • Order Status 09/29/2022 Delivery Successful    • Delivery Request Date 09/29/2022 09/29/2022    • Date Delivered  09/29/2022 10/14/2022    • Supplier Name 09/29/2022 10/14/2022    • Item Description 09/29/2022 CPAP Machine, Resmed S10 Auto-CPAP    • Item Description 09/29/2022 PAP Mask, Nasal, Fit Upon Setup, N/A, 1 per 3 months    • Item Description 09/29/2022 PAP Mask Interface Cushion, Nasal, 2 per 1 month    • Item Description 09/29/2022 PAP Headgear, 1 per 6 months    • Item Description 09/29/2022 PAP Humidifier, Heated    • Item Description 09/29/2022 Disposable PAP Filter, 2 per 1 month    • Item Description 09/29/2022 Non-Disposable PAP Filter, 1 per 6 months    • Item Description 09/29/2022 PAP Machine Tubing, Heated, 1 per 3 months    • Item Description 09/29/2022 PAP Monitoring Modem    • Item Description 09/29/2022 Humidifier Water Chamber, 1 per 6 months    • Item Description 09/29/2022 Standard Non-Heated PAP Tubing, 1 per 3 months    • Item Description 09/29/2022 PAP Chinstrap, 1 per 6 months

## 2022-11-18 NOTE — TELEPHONE ENCOUNTER
Spoke to lab @3:40, results still pending, the lady I spoke to on the phone isn't sure why results are taking this long      Pt was made aware we will call once results are in

## 2022-11-23 ENCOUNTER — TELEPHONE (OUTPATIENT)
Dept: FAMILY MEDICINE CLINIC | Facility: HOSPITAL | Age: 57
End: 2022-11-23

## 2022-11-23 NOTE — TELEPHONE ENCOUNTER
Levaquin 500mg prescribed, please advise if joint pain is SE of med and if she should finish out dose

## 2022-11-23 NOTE — TELEPHONE ENCOUNTER
Patient started taking med for a UTI on Saturday, November 19 and since starting this med, she has a lot of pressure in the back of her left knee and ankle  Should she continue this med?

## 2022-12-06 LAB
T4 FREE SERPL-MCNC: 1.47 NG/DL (ref 0.82–1.77)
TSH SERPL DL<=0.005 MIU/L-ACNC: 0.51 UIU/ML (ref 0.45–4.5)

## 2022-12-07 DIAGNOSIS — E03.9 HYPOTHYROIDISM, UNSPECIFIED TYPE: ICD-10-CM

## 2022-12-07 RX ORDER — LEVOTHYROXINE SODIUM 75 MCG
TABLET ORAL
Qty: 30 TABLET | Refills: 0 | Status: SHIPPED | OUTPATIENT
Start: 2022-12-07

## 2022-12-09 ENCOUNTER — OFFICE VISIT (OUTPATIENT)
Dept: ENDOCRINOLOGY | Facility: HOSPITAL | Age: 57
End: 2022-12-09

## 2022-12-09 VITALS
HEART RATE: 80 BPM | WEIGHT: 221 LBS | BODY MASS INDEX: 31.64 KG/M2 | SYSTOLIC BLOOD PRESSURE: 110 MMHG | HEIGHT: 70 IN | DIASTOLIC BLOOD PRESSURE: 78 MMHG | OXYGEN SATURATION: 99 %

## 2022-12-09 DIAGNOSIS — E55.9 VITAMIN D DEFICIENCY: ICD-10-CM

## 2022-12-09 DIAGNOSIS — E06.3 HYPOTHYROIDISM DUE TO HASHIMOTO'S THYROIDITIS: Primary | ICD-10-CM

## 2022-12-09 DIAGNOSIS — E04.2 GOITER, NONTOXIC, MULTINODULAR: ICD-10-CM

## 2022-12-09 DIAGNOSIS — G62.9 NEUROPATHY: ICD-10-CM

## 2022-12-09 DIAGNOSIS — E03.8 HYPOTHYROIDISM DUE TO HASHIMOTO'S THYROIDITIS: Primary | ICD-10-CM

## 2022-12-09 NOTE — PROGRESS NOTES
Joyce Current 56 y o  female MRN: [id number]    Encounter: [id number]      Assessment/Plan     Assessment: This is a 56y o -year-old female with hypothyroidism and thyroid nodules  Plan:  1  Hypothyroidism due to Hashimoto's thyroiditis: Recent thyroid lab work came back normal   Biochemically euthyroid  Does have some symptoms of hypothyroidism, but has been going on for years  Did discuss possibly adding Cytomel to see if this helps with symptoms, but they could also be unrelated to her thyroid  Since she recently started utilizing CPAP, she wants to wait a few months before slowly making adjustments to her medication  Contact the office with any concerns or questions  Follow-up in 1 year with lab work completed prior to visit  2   Thyroid nodules: Has some neck discomfort, and some occasional episodes of difficulty swallowing  Last ultrasound was completed 6 years ago, so we will order an ultrasound at this time  If needed may also consider CT of the neck for future evaluation, or have her follow-up with ENT or GI     3   Neuropathy: Had improvement after being placed on thyroid medication, but symptoms are now stable  Possibly due to herniated lumbar disks  Will order B12, folate, vitamin D, iron panel for further evaluation  CC: Hypothyroid follow-up    History of Present Illness     HPI:  Joyce Current is a 49year old female with history of hypothyroidism due to Hashimoto's thyroiditis with history of small thyroid nodule in the setting of a multinodular goiter for follow-up visit        She was diagnosed with Hashimoto's thyroid disease in her 25s  She has been on thyroid hormone for replacement purposes ever since  She is currently taking brand-name Synthroid 75 mcg daily  Has been on the same dose for several years  She will have some moments of sweating but mostly is cold  She denies heat intolerance  She has fatigue    She has not been sleeping well, and has recently started on a CPAP October 2022  Has had some minor improvement in her sleeping habits  She has some feelings of anxiety and nervousness with some tremors  She has no depression  She denies brittle nails or hair loss but has chronic dry skin  She denies diarrhea or constipation  Weight is stable  She denies diplopia  She denies compressive thyroid symptoms and has unchanged swallowing issues and anterior neck discomfort      Thyroid nodules were noted in 2009  She has been followed with serial ultrasounds  The last ultrasound was done in 2016 showing stable subcentimeter thyroid nodules  No repeat thyroid ultrasound has been needed  Review of Systems   Constitutional: Positive for fatigue  Negative for activity change, appetite change and unexpected weight change  HENT: Positive for trouble swallowing  Negative for sore throat  Anterior neck discomfort   Eyes: Negative for visual disturbance  Respiratory: Positive for apnea  Negative for chest tightness and shortness of breath  Cardiovascular: Negative for chest pain, palpitations and leg swelling  Gastrointestinal: Negative for abdominal pain, constipation, diarrhea, nausea and vomiting  Endocrine: Positive for cold intolerance  Negative for heat intolerance, polydipsia, polyphagia and polyuria  Genitourinary: Negative for frequency  Skin: Negative for wound  Dry skin   Neurological: Negative for dizziness, weakness, numbness and headaches  Psychiatric/Behavioral: Positive for sleep disturbance  Negative for dysphoric mood  The patient is nervous/anxious          Historical Information   Past Medical History:   Diagnosis Date   • Disc displacement, thoracic     T6   • GERD (gastroesophageal reflux disease)    • Hashimoto's thyroiditis    • Herniated cervical disc    • Paresthesia    • Thyroid nodule      Past Surgical History:   Procedure Laterality Date   • BREAST CYST EXCISION Right 2021    Benign   • COLONOSCOPY 12/28/2015    Rectal polyp removed- hyperplastic polyp; repeat in 10 years    • DENTAL SURGERY      teeth extractions, wisdom teeth   • ESOPHAGEAL DILATION     • ESOPHAGOGASTRODUODENOSCOPY  04/25/2016   • KNEE ARTHROSCOPY Left    • MAMMO STEREOTACTIC BREAST BIOPSY RIGHT (ALL INC) Right 5/13/2021     Social History   Social History     Substance and Sexual Activity   Alcohol Use Yes    Comment: social      Social History     Substance and Sexual Activity   Drug Use Not Currently     Social History     Tobacco Use   Smoking Status Never   Smokeless Tobacco Never     Family History:   Family History   Problem Relation Age of Onset   • Atrial fibrillation Mother    • Diabetes type II Mother    • Skin cancer Father         63's   • Colonic polyp Father    • Prostate cancer Father 61   • Arrhythmia Family    • Thyroid disease Family    • Colonic polyp Family    • Lupus Family    • Cervical cancer Paternal Aunt 61   • Mitral valve prolapse Sister    • Skin cancer Brother         52's   • Skin cancer Sister         42's   • No Known Problems Maternal Grandmother    • No Known Problems Maternal Grandfather    • No Known Problems Paternal Grandmother    • No Known Problems Paternal Grandfather    • No Known Problems Paternal Aunt    • No Known Problems Paternal Aunt    • No Known Problems Paternal Aunt    • No Known Problems Maternal Aunt    • No Known Problems Maternal Aunt    • No Known Problems Maternal Aunt        Meds/Allergies   Current Outpatient Medications   Medication Sig Dispense Refill   • Biotin 1 MG CAPS Take by mouth daily     • cholecalciferol (VITAMIN D3) 1,000 units tablet Take 1 tablet by mouth daily     • cyanocobalamin (VITAMIN B-12) 1,000 mcg tablet Take 1,000 mcg by mouth daily     • ferrous sulfate 325 (65 Fe) mg tablet Take 1 tablet by mouth every other day prn     • folic acid (FOLVITE) 1 mg tablet Take 1 tablet (1 mg total) by mouth daily (Patient taking differently: Take 1 mg by mouth daily prn) 30 tablet 0   • Lysine 1000 MG TABS Take 1 tablet by mouth daily     • magnesium oxide (MAG-OX) 400 mg Take 1 tablet (400 mg total) by mouth daily for 30 doses 30 tablet 0   • Multiple Vitamins-Minerals (ZINC PO) Take by mouth prn      • Synthroid 75 MCG tablet TAKE ONE TABLET BY MOUTH ONE TIME DAILY AS DIRECTED 30 tablet 0   • terbinafine (LamISIL) 250 mg tablet Take 1 tablet (250 mg total) by mouth daily (Patient not taking: Reported on 11/18/2022) 45 tablet 0   • urea (Ureacin-20) 20 % cream Apply topically as needed for dry skin 85 g 0   • zinc gluconate 50 mg tablet Take 50 mg by mouth daily     • estradiol (ESTRACE) 0 1 mg/g vaginal cream Insert 0 5 g into the vagina 2 (two) times a week (Patient not taking: Reported on 11/18/2022)       No current facility-administered medications for this visit  Allergies   Allergen Reactions   • Levofloxacin Other (See Comments)     Leg pain   • Penicillin G Benzathine Other (See Comments)     Childhood reaction   • Penicillins Other (See Comments) and Hives     Childhood reaction  Childhood reaction  Childhood reaction         Objective   Vitals: Blood pressure 110/78, pulse 80, height 5' 9 5" (1 765 m), weight 100 kg (221 lb), SpO2 99 %  Physical Exam  Vitals and nursing note reviewed  Constitutional:       General: She is not in acute distress  Appearance: Normal appearance  She is not diaphoretic  HENT:      Head: Normocephalic and atraumatic  Eyes:      General: No scleral icterus  Extraocular Movements: Extraocular movements intact  Conjunctiva/sclera: Conjunctivae normal       Pupils: Pupils are equal, round, and reactive to light  Neck:      Thyroid: Thyromegaly and thyroid tenderness present  No thyroid mass  Cardiovascular:      Rate and Rhythm: Normal rate and regular rhythm  Heart sounds: No murmur heard  Pulmonary:      Effort: Pulmonary effort is normal  No respiratory distress  Breath sounds: Normal breath sounds   No wheezing  Musculoskeletal:      Cervical back: Normal range of motion  Right lower leg: No edema  Left lower leg: No edema  Lymphadenopathy:      Cervical: No cervical adenopathy  Neurological:      Mental Status: She is alert and oriented to person, place, and time  Mental status is at baseline  Sensory: No sensory deficit  Motor: No tremor  Gait: Gait normal       Deep Tendon Reflexes:      Reflex Scores:       Patellar reflexes are 2+ on the right side and 2+ on the left side  Psychiatric:         Mood and Affect: Mood normal          Behavior: Behavior normal          Thought Content: Thought content normal          The history was obtained from the review of the chart, patient  Lab Results:   Lab Results   Component Value Date/Time    Free t4 1 47 12/05/2022 12:30 PM       Imaging Studies:   Results for orders placed during the hospital encounter of 09/10/16    US thyroid    Impression  Heterogeneous thyroid gland with diminished size of tiny left lower pole echogenic nodule  Workstation performed: [id number]      I have personally reviewed pertinent reports  Portions of the record may have been created with voice recognition software  Occasional wrong word or "sound a like" substitutions may have occurred due to the inherent limitations of voice recognition software  Read the chart carefully and recognize, using context, where substitutions have occurred

## 2022-12-09 NOTE — PATIENT INSTRUCTIONS
Continue Synthroid 75 mcg daily  Can consider adding Cytomel in the future to see if it helps with symptoms  Recommend getting ultrasound of thyroid completed  We will call with results  Contact the office if there is any change in symptoms  Follow-up in 1 year with lab work completed prior to visit

## 2022-12-28 LAB
25(OH)D3+25(OH)D2 SERPL-MCNC: 31.6 NG/ML (ref 30–100)
FERRITIN SERPL-MCNC: 490 NG/ML (ref 15–150)
FOLATE SERPL-MCNC: 13.4 NG/ML
IRON SATN MFR SERPL: 50 % (ref 15–55)
IRON SERPL-MCNC: 112 UG/DL (ref 27–159)
TIBC SERPL-MCNC: 226 UG/DL (ref 250–450)
UIBC SERPL-MCNC: 114 UG/DL (ref 131–425)
VIT B12 SERPL-MCNC: 1590 PG/ML (ref 232–1245)

## 2023-01-05 DIAGNOSIS — E03.9 HYPOTHYROIDISM, UNSPECIFIED TYPE: ICD-10-CM

## 2023-01-05 RX ORDER — LEVOTHYROXINE SODIUM 75 MCG
TABLET ORAL
Qty: 90 TABLET | Refills: 3 | Status: SHIPPED | OUTPATIENT
Start: 2023-01-05

## 2023-01-27 ENCOUNTER — OFFICE VISIT (OUTPATIENT)
Dept: FAMILY MEDICINE CLINIC | Facility: HOSPITAL | Age: 58
End: 2023-01-27

## 2023-01-27 VITALS
HEIGHT: 70 IN | BODY MASS INDEX: 30.81 KG/M2 | HEART RATE: 86 BPM | TEMPERATURE: 98.7 F | DIASTOLIC BLOOD PRESSURE: 76 MMHG | WEIGHT: 215.2 LBS | SYSTOLIC BLOOD PRESSURE: 126 MMHG

## 2023-01-27 DIAGNOSIS — G47.33 OSA (OBSTRUCTIVE SLEEP APNEA): ICD-10-CM

## 2023-01-27 DIAGNOSIS — R74.8 ELEVATED VITAMIN B12 LEVEL: ICD-10-CM

## 2023-01-27 DIAGNOSIS — R79.89 ELEVATED FERRITIN: Primary | ICD-10-CM

## 2023-01-27 NOTE — ASSESSMENT & PLAN NOTE
Having some issues with area of irritation on bridge of nose - appears to be a pressure point - has tried band aid w/o great benefit, I suggested seeing sleep medicine back to see about a different mask, she is worried it is an allergic rxn, discussed less likely as if allergic to mask I would think it wouldn't just be localized to bridge of nose and would be entire surface the mask touched, can try OTC HC cream and band aid and if not better needs sleep medicine re-eval for other mask options

## 2023-01-27 NOTE — PROGRESS NOTES
Name: Ca Allison      : 1965      MRN: [id number]  Encounter Provider: James Harvey DO  Encounter Date: 2023   Encounter department: [facility]  Elevated ferritin  Comments: Will stop iron supplement first, will recheck iron/ferritin/LFT's in 3 mos - BW order given, if ferritin still elevated will need further eval (transferrin, hepatitis labs, hemochromatosis gene study, GGT, CRP and RUQ US), will follow  Orders:  -     CBC; Future; Expected date: 2023  -     Iron; Future; Expected date: 2023  -     Ferritin; Future; Expected date: 2023  -     Hepatic function panel; Future; Expected date: 2023  -     CBC  -     Iron  -     Ferritin  -     Hepatic function panel    2  Elevated vitamin B12 level  Comments:  decrease current B12 supplement at 1000 mcg daily to alternating qod 500 mcg with 1000 mcg, recheck levels in 3 mos - BW order given  Orders:  -     Vitamin B12    3  COLUMBA (obstructive sleep apnea)  Assessment & Plan:  Having some issues with area of irritation on bridge of nose - appears to be a pressure point - has tried band aid w/o great benefit, I suggested seeing sleep medicine back to see about a different mask, she is worried it is an allergic rxn, discussed less likely as if allergic to mask I would think it wouldn't just be localized to bridge of nose and would be entire surface the mask touched, can try OTC HC cream and band aid and if not better needs sleep medicine re-eval for other mask options          Colonoscopy 12/15 - 10 yrs    Mammo     PAP  - 5 yrs    BW   Fasting BW       Subjective      HPI Pt here to review lab results and mult complaints    Pt has BW ordered by [facility] for eval of neuropathy  Was referred back to our office d/t abnormal labs that they ordered  BW results reviewed with pt in detail: folate and Vit D nml, B12 high nml at 1,590    Iron 112, TIBC 226 and ferritin 490  Elevated ferritin with upper nml iron reviewed  Vitamin B12 high as well  Supplements reviewed - she is on an oral iron supplement 325 mg daily and B12 1000 mcg daily  LFT's 9/1/22 were normal       She feels a lot of fatigue despite CPAP use  She is using the mask nightly and is sleeping 7-9 hrs of sleep  She con't to feel tired despite PAP use  She has a sore at the bridge of her nose which she feels is d/t her CPAP mask  She has used a band aid at the area and it helped a bit  Review of Systems   Constitutional: Positive for fatigue  Negative for chills and fever  HENT: Positive for congestion and postnasal drip  Respiratory: Positive for cough  Negative for shortness of breath and wheezing  Cardiovascular: Negative for chest pain and palpitations  Skin: Negative for rash and wound  Neurological: Negative for dizziness and headaches  Hematological: Negative for adenopathy         Current Outpatient Medications on File Prior to Visit   Medication Sig   • Biotin 1 MG CAPS Take by mouth daily   • cholecalciferol (VITAMIN D3) 1,000 units tablet Take 1 tablet by mouth daily   • cyanocobalamin (VITAMIN B-12) 1,000 mcg tablet Take 1,000 mcg by mouth daily   • estradiol (ESTRACE) 0 1 mg/g vaginal cream Insert 0 5 g into the vagina 2 (two) times a week (Patient not taking: Reported on 11/18/2022)   • ferrous sulfate 325 (65 Fe) mg tablet Take 1 tablet by mouth every other day prn   • folic acid (FOLVITE) 1 mg tablet Take 1 tablet (1 mg total) by mouth daily (Patient taking differently: Take 1 mg by mouth daily prn)   • Lysine 1000 MG TABS Take 1 tablet by mouth daily   • magnesium oxide (MAG-OX) 400 mg Take 1 tablet (400 mg total) by mouth daily for 30 doses   • Synthroid 75 MCG tablet TAKE ONE TABLET BY MOUTH ONE TIME DAILY in the morning on an empty stomach   • urea (Ureacin-20) 20 % cream Apply topically as needed for dry skin   • zinc gluconate 50 mg tablet Take 50 mg by mouth daily   • [DISCONTINUED] Multiple Vitamins-Minerals (ZINC PO) Take by mouth prn        Objective     /76   Pulse 86   Temp 98 7 °F (37 1 °C) (Tympanic)   Ht 5' 9 5" (1 765 m)   Wt 97 6 kg (215 lb 3 2 oz)   BMI 31 32 kg/m²     Physical Exam  Vitals and nursing note reviewed  Constitutional:       General: She is not in acute distress  Appearance: She is well-developed  She is not ill-appearing  HENT:      Head: Normocephalic and atraumatic  Right Ear: Tympanic membrane and external ear normal  There is no impacted cerumen  Left Ear: Tympanic membrane and external ear normal  There is no impacted cerumen  Eyes:      General:         Right eye: No discharge  Left eye: No discharge  Conjunctiva/sclera: Conjunctivae normal    Neck:      Trachea: No tracheal deviation  Cardiovascular:      Rate and Rhythm: Normal rate and regular rhythm  Heart sounds: Normal heart sounds  No murmur heard  No friction rub  Pulmonary:      Effort: Pulmonary effort is normal  No respiratory distress  Breath sounds: Normal breath sounds  No wheezing, rhonchi or rales  Musculoskeletal:      Cervical back: Neck supple  Skin:     General: Skin is warm  Findings: No rash  Neurological:      General: No focal deficit present  Mental Status: She is alert  Motor: No abnormal muscle tone  Gait: Gait normal    Psychiatric:         Mood and Affect: Mood normal          Behavior: Behavior normal          Thought Content:  Thought content normal          Judgment: Judgment normal        Dalia Dean DO

## 2023-02-10 ENCOUNTER — TELEPHONE (OUTPATIENT)
Dept: PULMONOLOGY | Facility: CLINIC | Age: 58
End: 2023-02-10

## 2023-02-10 NOTE — TELEPHONE ENCOUNTER
Pt called and left message on voicemail expressing a concern about a pressure sore on her nose from the cpap machine mask   She would like recommendations of how to treat it and if she can stop wearing the mask while it is healing   Please advise pt   thank you
 used

## 2023-04-29 LAB
ALBUMIN SERPL-MCNC: 4.1 G/DL (ref 3.8–4.9)
ALP SERPL-CCNC: 69 IU/L (ref 44–121)
ALT SERPL-CCNC: 14 IU/L (ref 0–32)
AST SERPL-CCNC: 18 IU/L (ref 0–40)
BILIRUB DIRECT SERPL-MCNC: 0.12 MG/DL (ref 0–0.4)
BILIRUB SERPL-MCNC: 0.2 MG/DL (ref 0–1.2)
ERYTHROCYTE [DISTWIDTH] IN BLOOD BY AUTOMATED COUNT: 11.7 % (ref 11.7–15.4)
FERRITIN SERPL-MCNC: 413 NG/ML (ref 15–150)
HCT VFR BLD AUTO: 38.5 % (ref 34–46.6)
HGB BLD-MCNC: 12.9 G/DL (ref 11.1–15.9)
IRON SERPL-MCNC: 64 UG/DL (ref 27–159)
MCH RBC QN AUTO: 32.3 PG (ref 26.6–33)
MCHC RBC AUTO-ENTMCNC: 33.5 G/DL (ref 31.5–35.7)
MCV RBC AUTO: 96 FL (ref 79–97)
PLATELET # BLD AUTO: 180 X10E3/UL (ref 150–450)
PROT SERPL-MCNC: 6.1 G/DL (ref 6–8.5)
RBC # BLD AUTO: 4 X10E6/UL (ref 3.77–5.28)
VIT B12 SERPL-MCNC: 960 PG/ML (ref 232–1245)
WBC # BLD AUTO: 5.1 X10E3/UL (ref 3.4–10.8)

## 2023-05-01 DIAGNOSIS — R79.89 ELEVATED FERRITIN: Primary | ICD-10-CM

## 2023-05-02 NOTE — PROGRESS NOTES
2-0 PDS figure of 8    closed with 1 looped PDS x 2 Patient arrived via:    __X___ambulatory    _____wheelchair    _____stretcher      Domestic violence screen    ___X___negative______positive    Breast Implants:    _______yes ____X____no Exploratory laparotomy performed revealing dilated small bowel and slightly bile tinged fluid in pelvis. 1 mm staple line defect in ileorectal anastomosis identified, repaired with 2-0 PDS figure of 8. Abdomen irrigated. 19F brandin placed overlying anastomosis. Midline incision closed with closed with 1 looped PDS x 2. Skin closed loosely with staples. Ileostomy brooked with 3-0 vicryl.

## 2023-05-08 LAB
CRP SERPL-MCNC: 3 MG/L (ref 0–10)
FERRITIN SERPL-MCNC: 499 NG/ML (ref 15–150)
GGT SERPL-CCNC: 9 IU/L (ref 0–60)
HCV AB S/CO SERPL IA: NON REACTIVE
HFE GENE MUT ANL BLD/T: NORMAL
TRANSFERRIN SERPL-MCNC: 222 MG/DL (ref 192–364)

## 2023-05-09 ENCOUNTER — TELEPHONE (OUTPATIENT)
Dept: FAMILY MEDICINE CLINIC | Facility: HOSPITAL | Age: 58
End: 2023-05-09

## 2023-05-09 DIAGNOSIS — Z12.31 SCREENING MAMMOGRAM FOR BREAST CANCER: Primary | ICD-10-CM

## 2023-05-09 DIAGNOSIS — E83.110 HEMOCHROMATOSIS ASSOCIATED WITH COMPOUND HETEROZYGOUS MUTATION IN HFE GENE (HCC): Primary | ICD-10-CM

## 2023-05-09 DIAGNOSIS — R79.89 ELEVATED FERRITIN: ICD-10-CM

## 2023-05-15 ENCOUNTER — HOSPITAL ENCOUNTER (OUTPATIENT)
Dept: ULTRASOUND IMAGING | Facility: HOSPITAL | Age: 58
Discharge: HOME | End: 2023-05-15

## 2023-05-15 DIAGNOSIS — R79.89 ELEVATED FERRITIN: ICD-10-CM

## 2023-06-12 ENCOUNTER — CONSULT (OUTPATIENT)
Age: 58
End: 2023-06-12
Payer: COMMERCIAL

## 2023-06-12 VITALS
WEIGHT: 217 LBS | HEART RATE: 75 BPM | OXYGEN SATURATION: 99 % | RESPIRATION RATE: 18 BRPM | HEIGHT: 70 IN | BODY MASS INDEX: 31.07 KG/M2 | SYSTOLIC BLOOD PRESSURE: 140 MMHG | DIASTOLIC BLOOD PRESSURE: 76 MMHG

## 2023-06-12 DIAGNOSIS — R79.89 ELEVATED FERRITIN: ICD-10-CM

## 2023-06-12 DIAGNOSIS — R61 NIGHT SWEATS: ICD-10-CM

## 2023-06-12 DIAGNOSIS — E83.110 HEMOCHROMATOSIS ASSOCIATED WITH COMPOUND HETEROZYGOUS MUTATION IN HFE GENE (HCC): ICD-10-CM

## 2023-06-12 DIAGNOSIS — G62.9 NEUROPATHY: Primary | ICD-10-CM

## 2023-06-12 DIAGNOSIS — R53.83 OTHER FATIGUE: ICD-10-CM

## 2023-06-12 PROCEDURE — 99203 OFFICE O/P NEW LOW 30 MIN: CPT | Performed by: INTERNAL MEDICINE

## 2023-06-12 NOTE — PROGRESS NOTES
Oncology Outpatient Consult Note  Telly Presley 66 y o  female MRN: @ Encounter: [id number]        Date:  6/12/2023        CC: Fatigue, heterozygosity for C282Y hemochromatosis gene      HPI:  Telly Presley is seen for initial consultation 6/12/2023 regarding regarding her complaints of fatigue and newly discovered C282Y hemochromatosis gene she is a heterozygote for this  She had iron studies done due to her complaint of fatigue which showed a ferritin of 499 and a percent sat of 50  She was on oral iron at the time and that has been discontinued  She had an ultrasound of the abdomen that showed mild hepatomegaly  Her other medical problems include hypothyroidism and GERD  She also has small bruises that sometimes happen without trauma  She has had it was a tooth removed in the past with no excessive bleeding  She has occasional gum bleeding  When she had her periods they were heavy at times  She has been in menopause since about 5 years ago  When she cuts herself she does not have any prolonged bleeding  She did not have any excessive bleeding after pregnancies  She has occasional infrequent minor nosebleeds  After other surgical procedure she has not had excessive bleeding  She does have sleep apnea and has been faithful about using her CPAP  That seems to have improved her fatigue incompletely  She does have night sweats at times  She also states that she has left-sided chest pain and occasional heart palpitations  She has dyspnea on exertion  She also states that her ankles will swell at times although they are not swollen today  She denies any weight loss  She works as a  and her fatigue has not made her miss work  He has recently started marcos back to the gym and that is going well  She also states at times she will get rashes on her ankles  She states that she has joint pain in her knees elbows and neck    She states that she has numbness in her lateral thighs that sometimes shoots down into her calves  She denies any diarrhea or constipation  She is due for her mammogram   He is up-to-date on skin checks  She is up-to-date on colonoscopies  Her family history is unremarkable for any known diagnosis of hemochromatosis or bleeding disorders  Her father had colon polyps and melanoma  Her brother has melanoma  She has a paternal aunt with cervical cancer in her maternal aunt with uterine cancer  She does not smoke  she drinks alcohol infrequently  ROS: As stated in history of present illness otherwise her 14 point review of systems today was negative  ECOG PS: 0    Cancer Staging:  Cancer Staging   No matching staging information was found for the patient  Test Results:    Imaging: US abdomen limited    Result Date: 5/18/2023  Narrative: RIGHT UPPER QUADRANT ULTRASOUND INDICATION:    R79 89: Other specified abnormal findings of blood chemistry  COMPARISON: Ultrasound 6/20/2015  TECHNIQUE:   Real-time ultrasound of the right upper quadrant was performed with a curvilinear transducer with both volumetric sweeps and still imaging techniques  FINDINGS: PANCREAS:  Visualized portions of the pancreas are within normal limits  AORTA AND IVC:  Visualized portions are normal for patient age  LIVER: Size: Mildly enlarged  The liver measures 18 cm in the midclavicular line  Contour:  Surface contour is smooth  Parenchyma:  Echogenicity and echotexture are within normal limits  Limited imaging of the main portal vein shows it to be patent and hepatopetal  No liver mass identified  BILIARY: The gallbladder is normal in caliber  No wall thickening or pericholecystic fluid  Multiple layering echogenic calculi identified  No sonographic Christie's sign  No intrahepatic biliary dilatation  CBD measures 3 0 mm  No choledocholithiasis  KIDNEY: Right kidney measures 11 1 SAG x 5 0 AP x 5 3 TRV cm  Volume 154 7 mL Kidney within normal limits  ASCITES:   None  "    Impression: Mild hepatomegaly  Cholelithiasis   Workstation performed: [id number]       Labs:   Lab Results   Component Value Date    HCT 38 5 04/28/2023    HGB 12 9 04/28/2023    MCV 96 04/28/2023     04/28/2023    WBC 5 1 04/28/2023     Lab Results   Component Value Date    ALT 14 04/28/2023    AST 18 04/28/2023    BUN 12 09/01/2022     09/01/2022    CO2 21 09/01/2022    CREATININE 0 63 09/01/2022    EGFR 105 09/01/2022    K 3 9 09/01/2022         No results found for: \"SPEP\", \"UPEP\"    No results found for: \"PSA\"    No results found for: \"CEA\"    No results found for: \"AFP\"    Lab Results   Component Value Date    FERRITIN 499 (H) 05/02/2023    IRON 64 04/28/2023    TIBC 226 (L) 12/27/2022       Lab Results   Component Value Date    KDCXRYDQ42 960 04/28/2023               Active Problems:   Patient Active Problem List   Diagnosis   • GERD (gastroesophageal reflux disease)   • Bulging lumbar disc   • Compression of common peroneal nerve   • Hypothyroidism due to Hashimoto's thyroiditis   • Restless legs syndrome   • Sensory neuropathy   • Vitamin D deficiency   • Paresthesia   • Goiter, nontoxic, multinodular   • Onychomycosis   • COLUMBA (obstructive sleep apnea)       Past Medical History:   Past Medical History:   Diagnosis Date   • Disc displacement, thoracic     T6   • GERD (gastroesophageal reflux disease)    • Herniated cervical disc    • Paresthesia        Surgical History:   Past Surgical History:   Procedure Laterality Date   • BREAST CYST EXCISION Right 2021    Benign   • COLONOSCOPY  12/28/2015    Rectal polyp removed- hyperplastic polyp; repeat in 10 years    • DENTAL SURGERY      teeth extractions, wisdom teeth   • ESOPHAGEAL DILATION     • ESOPHAGOGASTRODUODENOSCOPY  04/25/2016   • KNEE ARTHROSCOPY Left    • MAMMO STEREOTACTIC BREAST BIOPSY RIGHT (ALL INC) Right 5/13/2021       Family History:    Family History   Problem Relation Age of Onset   • Atrial fibrillation Mother    • Diabetes " type II Mother    • Skin cancer Father         63's   • Colonic polyp Father    • Prostate cancer Father 61   • Arrhythmia Family    • Thyroid disease Family    • Colonic polyp Family    • Lupus Family    • Cervical cancer Paternal Aunt 61   • Mitral valve prolapse Sister    • Skin cancer Brother         52's   • Skin cancer Sister         42's   • No Known Problems Maternal Grandmother    • No Known Problems Maternal Grandfather    • No Known Problems Paternal Grandmother    • No Known Problems Paternal Grandfather    • No Known Problems Paternal Aunt    • No Known Problems Paternal Aunt    • No Known Problems Paternal Aunt    • No Known Problems Maternal Aunt    • No Known Problems Maternal Aunt    • No Known Problems Maternal Aunt        Cancer-related family history includes Cervical cancer (age of onset: 61) in her paternal aunt; Prostate cancer (age of onset: 61) in her father; Skin cancer in her brother, father, and sister      Social History:   Social History     Socioeconomic History   • Marital status: /Civil Union     Spouse name: Not on file   • Number of children: Not on file   • Years of education: Not on file   • Highest education level: Not on file   Occupational History   • Occupation:    Tobacco Use   • Smoking status: Never   • Smokeless tobacco: Never   Vaping Use   • Vaping Use: Never used   Substance and Sexual Activity   • Alcohol use: Yes     Comment: social    • Drug use: Not Currently   • Sexual activity: Not on file   Other Topics Concern   • Not on file   Social History Narrative   • Not on file     Social Determinants of Health     Financial Resource Strain: Not on file   Food Insecurity: Not on file   Transportation Needs: Not on file   Physical Activity: Not on file   Stress: Not on file   Social Connections: Not on file   Intimate Partner Violence: Not on file   Housing Stability: Not on file       Current Medications:   Current Outpatient Medications   Medication Sig Dispense Refill   • Biotin 1 MG CAPS Take by mouth daily     • cholecalciferol (VITAMIN D3) 1,000 units tablet Take 1 tablet by mouth daily     • cyanocobalamin (VITAMIN B-12) 1,000 mcg tablet Take 1,000 mcg by mouth daily     • folic acid (FOLVITE) 1 mg tablet Take 1 tablet (1 mg total) by mouth daily (Patient taking differently: Take 1 mg by mouth daily prn) 30 tablet 0   • Lysine 1000 MG TABS Take 1 tablet by mouth daily     • magnesium oxide (MAG-OX) 400 mg Take 1 tablet (400 mg total) by mouth daily for 30 doses 30 tablet 0   • Synthroid 75 MCG tablet TAKE ONE TABLET BY MOUTH ONE TIME DAILY in the morning on an empty stomach 90 tablet 3   • zinc gluconate 50 mg tablet Take 50 mg by mouth daily     • estradiol (ESTRACE) 0 1 mg/g vaginal cream Insert 0 5 g into the vagina 2 (two) times a week (Patient not taking: Reported on 11/18/2022)     • urea (Ureacin-20) 20 % cream Apply topically as needed for dry skin (Patient not taking: Reported on 6/12/2023) 85 g 0     No current facility-administered medications for this visit  Allergies: Allergies   Allergen Reactions   • Levofloxacin Other (See Comments)     Leg pain   • Penicillin G Benzathine Other (See Comments)     Childhood reaction   • Penicillins Other (See Comments) and Hives     Childhood reaction  Childhood reaction  Childhood reaction           Physical Exam:    Body surface area is 2 15 meters squared      Wt Readings from Last 3 Encounters:   06/12/23 98 4 kg (217 lb)   01/27/23 97 6 kg (215 lb 3 2 oz)   12/09/22 100 kg (221 lb)        Temp Readings from Last 3 Encounters:   01/27/23 98 7 °F (37 1 °C) (Tympanic)   11/18/22 98 2 °F (36 8 °C) (Tympanic)   10/27/22 97 6 °F (36 4 °C) (Temporal)        BP Readings from Last 3 Encounters:   06/12/23 140/76   01/27/23 126/76   12/09/22 110/78         Pulse Readings from Last 3 Encounters:   06/12/23 75   01/27/23 86   12/09/22 80     @LASTSAO2(3)@    Physical Exam     Constitutional   General appearance: No acute distress, well appearing and well nourished  Eyes   Conjunctiva and lids: No swelling, erythema or discharge  Pupils and irises: Equal, round and reactive to light  Ears, Nose, Mouth, and Throat   External inspection of ears and nose: Normal     Nasal mucosa, septum, and turbinates: Normal without edema or erythema  Oropharynx: Normal with no erythema, edema, exudate or lesions  Pulmonary   Respiratory effort: No increased work of breathing or signs of respiratory distress  Auscultation of lungs: Clear to auscultation  Cardiovascular   Palpation of heart: Normal PMI, no thrills  Auscultation of heart: Normal rate and rhythm, normal S1 and S2, without murmurs  Examination of extremities for edema and/or varicosities: Normal     Carotid pulses: Normal     Abdomen   Abdomen: Non-tender, no masses  Liver and spleen: No hepatomegaly or splenomegaly  Lymphatic   Palpation of lymph nodes in neck: No lymphadenopathy  Musculoskeletal   Gait and station: Normal     Digits and nails: Normal without clubbing or cyanosis  Inspection/palpation of joints, bones, and muscles: Normal     Skin   Skin and subcutaneous tissue: Normal without rashes or lesions  scattered small bruises  Neurologic   Cranial nerves: Cranial nerves 2-12 intact  Sensation: No sensory loss  Psychiatric   Orientation to person, place, and time: Normal     Mood and affect: Normal           Assessment/ Plan: 29-year-old female who is a heterozygote for the hemochromatosis gene C282Y  Really this does not manifest in the disease and requires 2 copies to have hemochromatosis  I explained to her that ferritin is an acute phase reactant and there may be some underlying inflammation that is driving it up  I'm going to check a sed rate and a C-reactive protein  I am also going to check his SPEP and free light chains due to her complaints of numbness    Because of the fatigue and night sweats I am going to check a CT of the chest abdomen and pelvis  I will see her back in 2 to 3 weeks to review these results  If we do not find an obvious cause for her fatigue, I would recommend that she has a stress test to see if the origin is cardiac in nature  I do not believe she needs any phlebotomies at this time  In the future she could consider blood donation but I would prefer to find the cause of her fatigue prior to her doing this  I spent 30 minutes in chart review, face to face counseling, coordination of care, and documentation

## 2023-06-14 LAB
ALBUMIN SERPL ELPH-MCNC: 3.8 G/DL (ref 2.9–4.4)
ALBUMIN/GLOB SERPL: 1.4 {RATIO} (ref 0.7–1.7)
ALPHA1 GLOB SERPL ELPH-MCNC: 0.3 G/DL (ref 0–0.4)
ALPHA2 GLOB SERPL ELPH-MCNC: 0.6 G/DL (ref 0.4–1)
B-GLOBULIN SERPL ELPH-MCNC: 1 G/DL (ref 0.7–1.3)
CRP SERPL-MCNC: 3 MG/L (ref 0–10)
ERYTHROCYTE [SEDIMENTATION RATE] IN BLOOD BY WESTERGREN METHOD: 21 MM/HR (ref 0–40)
GAMMA GLOB SERPL ELPH-MCNC: 0.9 G/DL (ref 0.4–1.8)
GLOBULIN SER CALC-MCNC: 2.7 G/DL (ref 2.2–3.9)
KAPPA LC FREE SER-MCNC: 14.5 MG/L (ref 3.3–19.4)
KAPPA LC FREE/LAMBDA FREE SER: 1.86 {RATIO} (ref 0.26–1.65)
LABORATORY COMMENT REPORT: NORMAL
LAMBDA LC FREE SERPL-MCNC: 7.8 MG/L (ref 5.7–26.3)
M PROTEIN SERPL ELPH-MCNC: NORMAL G/DL
PROT SERPL-MCNC: 6.5 G/DL (ref 6–8.5)

## 2023-06-20 ENCOUNTER — TELEPHONE (OUTPATIENT)
Dept: HEMATOLOGY ONCOLOGY | Facility: CLINIC | Age: 58
End: 2023-06-20

## 2023-06-20 DIAGNOSIS — R61 NIGHT SWEATS: ICD-10-CM

## 2023-06-20 DIAGNOSIS — E83.110 HEMOCHROMATOSIS ASSOCIATED WITH COMPOUND HETEROZYGOUS MUTATION IN HFE GENE (HCC): Primary | ICD-10-CM

## 2023-06-20 DIAGNOSIS — R53.83 OTHER FATIGUE: ICD-10-CM

## 2023-06-20 NOTE — TELEPHONE ENCOUNTER
Left VM for patient explaining that her insurance is only covering the CT abdomen/pelvis and we will need to get a chest x-ray  I asked that she call me back at my direct teams number to discuss further

## 2023-06-21 ENCOUNTER — HOSPITAL ENCOUNTER (OUTPATIENT)
Dept: RADIOLOGY | Facility: HOSPITAL | Age: 58
Discharge: HOME | End: 2023-06-21
Payer: COMMERCIAL

## 2023-06-21 ENCOUNTER — HOSPITAL ENCOUNTER (OUTPATIENT)
Dept: CT IMAGING | Facility: HOSPITAL | Age: 58
Discharge: HOME | End: 2023-06-21
Attending: INTERNAL MEDICINE
Payer: COMMERCIAL

## 2023-06-21 DIAGNOSIS — R53.83 OTHER FATIGUE: ICD-10-CM

## 2023-06-21 DIAGNOSIS — E83.110 HEMOCHROMATOSIS ASSOCIATED WITH COMPOUND HETEROZYGOUS MUTATION IN HFE GENE (HCC): ICD-10-CM

## 2023-06-21 DIAGNOSIS — R61 NIGHT SWEATS: ICD-10-CM

## 2023-06-21 PROCEDURE — 71046 X-RAY EXAM CHEST 2 VIEWS: CPT

## 2023-06-21 PROCEDURE — G1004 CDSM NDSC: HCPCS

## 2023-06-21 PROCEDURE — 74177 CT ABD & PELVIS W/CONTRAST: CPT

## 2023-06-21 RX ADMIN — IOHEXOL 80 ML: 350 INJECTION, SOLUTION INTRAVENOUS at 07:02

## 2023-07-05 ENCOUNTER — OFFICE VISIT (OUTPATIENT)
Age: 58
End: 2023-07-05
Payer: COMMERCIAL

## 2023-07-05 VITALS
HEIGHT: 69 IN | WEIGHT: 223 LBS | SYSTOLIC BLOOD PRESSURE: 128 MMHG | OXYGEN SATURATION: 98 % | BODY MASS INDEX: 33.03 KG/M2 | DIASTOLIC BLOOD PRESSURE: 80 MMHG | RESPIRATION RATE: 17 BRPM | TEMPERATURE: 97.7 F | HEART RATE: 73 BPM

## 2023-07-05 DIAGNOSIS — R53.83 OTHER FATIGUE: Primary | ICD-10-CM

## 2023-07-05 PROCEDURE — 99213 OFFICE O/P EST LOW 20 MIN: CPT | Performed by: INTERNAL MEDICINE

## 2023-07-05 NOTE — PROGRESS NOTES
HEMATOLOGY / [facility] FOLLOW UP NOTE    Primary Care Provider: Carolyne Mooney DO  Referring Provider:    MRN: [id number]  : 1965    Reason for Encounter: follow up fatigue, elevated ferritin, one copy C282Y gene for hemochromatosis       Interval History: Patient presents for follow up of of her work-up of the above listed problems. To look for inflammation that may be driving the elevated ferritin I sent a sed rate and C-reactive protein that were normal.  Free light chain ratio and M protein were negative. We also attempted to do a CT of the chest abdomen and pelvis to look for any occult lymphoma that could be driving her fatigue. Her insurance company would not allow me to do a CT scan so I had to do a chest x-ray that was negative. CT of the abdomen and pelvis showed gallstones without acute cholecystitis. She still has complaints of fatigue and weight gain. She also states that she gets winded when she goes up a flight of stairs. REVIEW OF SYSTEMS:  Please note that a 14-point review of systems was performed to include Constitutional, HEENT, Respiratory, CVS, GI, , Musculoskeletal, Integumentary, Neurologic, Rheumatologic, Endocrinologic, Psychiatric, Lymphatic, and Hematologic/Oncologic systems were reviewed and are negative unless otherwise stated in HPI. Positive and negative findings pertinent to this evaluation are incorporated into the history of present illness.       ECOG PS: 0    PROBLEM LIST:  Patient Active Problem List   Diagnosis   • GERD (gastroesophageal reflux disease)   • Bulging lumbar disc   • Compression of common peroneal nerve   • Hypothyroidism due to Hashimoto's thyroiditis   • Restless legs syndrome   • Sensory neuropathy   • Vitamin D deficiency   • Paresthesia   • Goiter, nontoxic, multinodular   • Onychomycosis   • COLUMBA (obstructive sleep apnea)       Assessment / Plan: 56-year-old female with fatigue, weight gain, elevated ferritin, 1 copy of the hemochromatosis gene C282Y. I cannot find any obvious hematologic or oncologic cause for her fatigue. I recommend that she sees a cardiologist for stress test.  She believes that her PCP has to make that referral so I will send my note to her. Once she has a cardiac evaluation it would be reasonable for her to give blood on a regular basis to decrease her ferritin. There is no indications for active phlebotomy in patients with only 1 copy of the hemochromatosis gene. Other things that could be considered would be a referral to rheumatology to see if there are any other active inflammatory disorders. She is also concerned about her weight gain and a referral to endocrinology or weight management may also be appropriate. I am going to leave our follow-up on an as-needed basis for now. I will be available if any questions arise in the future. I spent 20 minutes on chart review, face to face counseling time, coordination of care and documentation. Past Medical History:   has a past medical history of Disc displacement, thoracic, GERD (gastroesophageal reflux disease), Herniated cervical disc, and Paresthesia. PAST SURGICAL HISTORY:   has a past surgical history that includes Colonoscopy (12/28/2015); Dental surgery; Esophagogastroduodenoscopy (04/25/2016); Knee arthroscopy (Left); Esophageal dilation; Mammo stereotactic breast biopsy right (all inc) (Right, 5/13/2021); and Breast cyst excision (Right, 2021).     CURRENT MEDICATIONS  Current Outpatient Medications   Medication Sig Dispense Refill   • Biotin 1 MG CAPS Take by mouth daily     • cholecalciferol (VITAMIN D3) 1,000 units tablet Take 1 tablet by mouth daily     • cyanocobalamin (VITAMIN B-12) 1,000 mcg tablet Take 1,000 mcg by mouth daily     • folic acid (FOLVITE) 1 mg tablet Take 1 tablet (1 mg total) by mouth daily (Patient taking differently: Take 1 mg by mouth daily prn) 30 tablet 0   • Lysine 1000 MG TABS Take 1 tablet by mouth daily • Synthroid 75 MCG tablet TAKE ONE TABLET BY MOUTH ONE TIME DAILY in the morning on an empty stomach 90 tablet 3   • zinc gluconate 50 mg tablet Take 50 mg by mouth daily     • estradiol (ESTRACE) 0.1 mg/g vaginal cream Insert 0.5 g into the vagina 2 (two) times a week (Patient not taking: Reported on 11/18/2022)     • magnesium oxide (MAG-OX) 400 mg Take 1 tablet (400 mg total) by mouth daily for 30 doses 30 tablet 0   • urea (Ureacin-20) 20 % cream Apply topically as needed for dry skin (Patient not taking: Reported on 6/12/2023) 85 g 0     No current facility-administered medications for this visit. @[employer]@    SOCIAL HISTORY:   reports that she has never smoked. She has never used smokeless tobacco. She reports current alcohol use. She reports that she does not currently use drugs. FAMILY HISTORY:  family history includes Arrhythmia in her family; Atrial fibrillation in her mother; Cervical cancer (age of onset: 61) in her paternal aunt; Colonic polyp in her family and father; Diabetes type II in her mother; Lupus in her family; Mitral valve prolapse in her sister; No Known Problems in her maternal aunt, maternal aunt, maternal aunt, maternal grandfather, maternal grandmother, paternal aunt, paternal aunt, paternal aunt, paternal grandfather, and paternal grandmother; Prostate cancer (age of onset: 61) in her father; Skin cancer in her brother, father, and sister; Thyroid disease in her family. ALLERGIES:  is allergic to levofloxacin, penicillin g benzathine, and penicillins. Physical Exam:  Vital Signs:   Visit Vitals  /80 (BP Location: Left arm, Patient Position: Sitting, Cuff Size: Adult)   Pulse 73   Temp 97.7 °F (36.5 °C)   Resp 17   Ht 5' 9" (1.753 m)   Wt 101 kg (223 lb)   SpO2 98%   BMI 32.93 kg/m²   OB Status Postmenopausal   Smoking Status Never   BSA 2.16 m²     Body mass index is 32.93 kg/m². Body surface area is 2.16 meters squared.     GEN: Alert, awake oriented x3, in no acute distress  HEENT- No pallor, icterus, cyanosis, no oral mucosal lesions,   LAD - no palpable cervical, clavicle, axillary, inguinal LAD  Heart- normal S1 S2, regular rate and rhythm, No murmur, rubs.    Lungs- clear breathing sound bilateral.   Abdomen- soft, Non tender, bowel sounds present  Extremities- No cyanosis, clubbing, edema  Neuro- No focal neurological deficit    Labs:  Lab Results   Component Value Date    WBC 5.1 04/28/2023    HGB 12.9 04/28/2023    HCT 38.5 04/28/2023    MCV 96 04/28/2023     04/28/2023     Lab Results   Component Value Date    SODIUM 139 09/01/2022    K 3.9 09/01/2022     09/01/2022    CO2 21 09/01/2022    BUN 12 09/01/2022    CREATININE 0.63 09/01/2022    GLUC 93 09/01/2022    AST 18 04/28/2023    ALT 14 04/28/2023    TP 6.5 06/13/2023    TBILI 0.2 04/28/2023    EGFR 105 09/01/2022

## 2023-07-14 ENCOUNTER — HOSPITAL ENCOUNTER (OUTPATIENT)
Dept: MAMMOGRAPHY | Facility: IMAGING CENTER | Age: 58
Discharge: HOME | End: 2023-07-14
Payer: COMMERCIAL

## 2023-07-14 VITALS — HEIGHT: 69 IN | BODY MASS INDEX: 32.58 KG/M2 | WEIGHT: 220 LBS

## 2023-07-14 DIAGNOSIS — Z12.31 SCREENING MAMMOGRAM FOR BREAST CANCER: ICD-10-CM

## 2023-07-14 PROCEDURE — 77063 BREAST TOMOSYNTHESIS BI: CPT

## 2023-07-14 PROCEDURE — 77067 SCR MAMMO BI INCL CAD: CPT

## 2023-07-18 ENCOUNTER — TELEPHONE (OUTPATIENT)
Dept: FAMILY MEDICINE CLINIC | Facility: HOSPITAL | Age: 58
End: 2023-07-18

## 2023-07-18 NOTE — TELEPHONE ENCOUNTER
Per Dr Bharat Hyatt note, she is recommending patient get a referral from PCP to see cardiology.    Please advise

## 2023-07-18 NOTE — TELEPHONE ENCOUNTER
Wants to know what the next steps are to help with the fatigue she has been having. She can't get rid of it. Dr Josue Alfredo said she might need a stress test?    She would like a call to see what is the best course of action.

## 2023-07-21 ENCOUNTER — OFFICE VISIT (OUTPATIENT)
Dept: FAMILY MEDICINE CLINIC | Facility: HOSPITAL | Age: 58
End: 2023-07-21
Payer: COMMERCIAL

## 2023-07-21 VITALS
BODY MASS INDEX: 32.73 KG/M2 | HEART RATE: 76 BPM | OXYGEN SATURATION: 98 % | SYSTOLIC BLOOD PRESSURE: 124 MMHG | WEIGHT: 221 LBS | HEIGHT: 69 IN | DIASTOLIC BLOOD PRESSURE: 82 MMHG | TEMPERATURE: 96.6 F

## 2023-07-21 DIAGNOSIS — E03.8 HYPOTHYROIDISM DUE TO HASHIMOTO'S THYROIDITIS: ICD-10-CM

## 2023-07-21 DIAGNOSIS — R07.9 CHEST PAIN, UNSPECIFIED TYPE: ICD-10-CM

## 2023-07-21 DIAGNOSIS — R53.83 OTHER FATIGUE: Primary | ICD-10-CM

## 2023-07-21 DIAGNOSIS — R06.09 DOE (DYSPNEA ON EXERTION): ICD-10-CM

## 2023-07-21 DIAGNOSIS — E06.3 HYPOTHYROIDISM DUE TO HASHIMOTO'S THYROIDITIS: ICD-10-CM

## 2023-07-21 PROCEDURE — 99214 OFFICE O/P EST MOD 30 MIN: CPT | Performed by: INTERNAL MEDICINE

## 2023-07-21 PROCEDURE — 93000 ELECTROCARDIOGRAM COMPLETE: CPT | Performed by: INTERNAL MEDICINE

## 2023-07-21 NOTE — ASSESSMENT & PLAN NOTE
Will check TSH to ensure still at goal range with concurrent symptoms noted above, con't current levothyroxine for now

## 2023-07-22 LAB
B BURGDOR IGG+IGM SER QL IA: NEGATIVE
BASOPHILS # BLD AUTO: 0 X10E3/UL (ref 0–0.2)
BASOPHILS NFR BLD AUTO: 0 %
BUN SERPL-MCNC: 13 MG/DL (ref 6–24)
BUN/CREAT SERPL: 18 (ref 9–23)
CALCIUM SERPL-MCNC: 9.8 MG/DL (ref 8.7–10.2)
CHLORIDE SERPL-SCNC: 103 MMOL/L (ref 96–106)
CO2 SERPL-SCNC: 25 MMOL/L (ref 20–29)
CREAT SERPL-MCNC: 0.74 MG/DL (ref 0.57–1)
EGFR: 95 ML/MIN/1.73
EOSINOPHIL # BLD AUTO: 0 X10E3/UL (ref 0–0.4)
EOSINOPHIL NFR BLD AUTO: 1 %
ERYTHROCYTE [DISTWIDTH] IN BLOOD BY AUTOMATED COUNT: 11.7 % (ref 11.7–15.4)
GLUCOSE SERPL-MCNC: 87 MG/DL (ref 70–99)
HCT VFR BLD AUTO: 40 % (ref 34–46.6)
HGB BLD-MCNC: 13.1 G/DL (ref 11.1–15.9)
IMM GRANULOCYTES # BLD: 0 X10E3/UL (ref 0–0.1)
IMM GRANULOCYTES NFR BLD: 0 %
LYMPHOCYTES # BLD AUTO: 1.1 X10E3/UL (ref 0.7–3.1)
LYMPHOCYTES NFR BLD AUTO: 21 %
MCH RBC QN AUTO: 31.5 PG (ref 26.6–33)
MCHC RBC AUTO-ENTMCNC: 32.8 G/DL (ref 31.5–35.7)
MCV RBC AUTO: 96 FL (ref 79–97)
MONOCYTES # BLD AUTO: 0.4 X10E3/UL (ref 0.1–0.9)
MONOCYTES NFR BLD AUTO: 8 %
NEUTROPHILS # BLD AUTO: 3.6 X10E3/UL (ref 1.4–7)
NEUTROPHILS NFR BLD AUTO: 70 %
PLATELET # BLD AUTO: 195 X10E3/UL (ref 150–450)
POTASSIUM SERPL-SCNC: 4.8 MMOL/L (ref 3.5–5.2)
RBC # BLD AUTO: 4.16 X10E6/UL (ref 3.77–5.28)
SODIUM SERPL-SCNC: 140 MMOL/L (ref 134–144)
TSH SERPL DL<=0.005 MIU/L-ACNC: 0.76 UIU/ML (ref 0.45–4.5)
WBC # BLD AUTO: 5.2 X10E3/UL (ref 3.4–10.8)

## 2023-08-01 ENCOUNTER — TELEPHONE (OUTPATIENT)
Dept: FAMILY MEDICINE CLINIC | Facility: HOSPITAL | Age: 58
End: 2023-08-01

## 2023-08-01 NOTE — TELEPHONE ENCOUNTER
Hi, my name is Noam Downs, Date of birth is [birth year]. I'm calling for a referral for my upcoming August 22nd I exam with a [id number]. If I could get a referral issues for that. And also just to confirm that I already have referrals in the system for my August 19th a stress test and echocardiogram and my August 14th a dietitian appointment. If there's any questions, if you could give me a call back, my number is [phone]. Thanks and have a great day. I CHECKED CHART AND LOOKS LIKE EVERYTHING IS OK --IN CHART EXCEPT FOR [facility]! I'M ASSUMING THESE ARE JUST FOR THE REF TO BE IN AND NOT INSURANCE REFS!     DK

## 2023-08-09 ENCOUNTER — HOSPITAL ENCOUNTER (OUTPATIENT)
Dept: NON INVASIVE DIAGNOSTICS | Facility: HOSPITAL | Age: 58
Discharge: HOME | End: 2023-08-09
Payer: COMMERCIAL

## 2023-08-09 VITALS
SYSTOLIC BLOOD PRESSURE: 124 MMHG | HEART RATE: 73 BPM | WEIGHT: 221 LBS | HEIGHT: 69 IN | DIASTOLIC BLOOD PRESSURE: 82 MMHG | BODY MASS INDEX: 32.73 KG/M2

## 2023-08-09 DIAGNOSIS — R07.9 CHEST PAIN, UNSPECIFIED TYPE: ICD-10-CM

## 2023-08-09 DIAGNOSIS — R53.83 OTHER FATIGUE: ICD-10-CM

## 2023-08-09 LAB
AORTIC ROOT: 3.5 CM
AORTIC VALVE MEAN VELOCITY: 10.1 M/S
APICAL FOUR CHAMBER EJECTION FRACTION: 70 %
ASCENDING AORTA: 3.3 CM
AV LVOT MEAN GRADIENT: 2 MMHG
AV LVOT PEAK GRADIENT: 3 MMHG
AV MEAN GRADIENT: 5 MMHG
AV PEAK GRADIENT: 8 MMHG
AV VELOCITY RATIO: 0.64
DOP CALC AO PEAK VEL: 1.38 M/S
DOP CALC AO VTI: 33.76 CM
DOP CALC LVOT PEAK VEL VTI: 21.01 CM
DOP CALC LVOT PEAK VEL: 0.89 M/S
DOP CALC MV VTI: 23.43 CM
E WAVE DECELERATION TIME: 195 MS
FRACTIONAL SHORTENING: 35 % (ref 28–44)
INTERVENTRICULAR SEPTUM IN DIASTOLE (PARASTERNAL SHORT AXIS VIEW): 0.8 CM
INTERVENTRICULAR SEPTUM: 0.8 CM (ref 0.6–1.1)
LAAS-AP2: 14.1 CM2
LAAS-AP4: 12.1 CM2
LEFT ATRIUM SIZE: 3.5 CM
LEFT ATRIUM VOLUME (MOD BIPLANE): 33 ML
LEFT INTERNAL DIMENSION IN SYSTOLE: 3 CM (ref 2.1–4)
LEFT VENTRICLE DIASTOLIC VOLUME (MOD BIPLANE): 91 ML
LEFT VENTRICLE SYSTOLIC VOLUME (MOD BIPLANE): 28 ML
LEFT VENTRICULAR INTERNAL DIMENSION IN DIASTOLE: 4.6 CM (ref 3.5–6)
LEFT VENTRICULAR POSTERIOR WALL IN END DIASTOLE: 0.8 CM
LEFT VENTRICULAR STROKE VOLUME: 61 ML
LV EF: 69 %
LVSV (TEICH): 61 ML
MV E'TISSUE VEL-LAT: 10 CM/S
MV E'TISSUE VEL-SEP: 13 CM/S
MV MEAN GRADIENT: 1 MMHG
MV PEAK A VEL: 0.57 M/S
MV PEAK E VEL: 87 CM/S
MV PEAK GRADIENT: 4 MMHG
MV STENOSIS PRESSURE HALF TIME: 56 MS
MV VALVE AREA P 1/2 METHOD: 3.93 CM2
RIGHT ATRIAL 2D VOLUME: 25 ML
RIGHT ATRIUM AREA SYSTOLE A4C: 11 CM2
RIGHT VENTRICLE ID DIMENSION: 3.5 CM
SL CV LEFT ATRIUM LENGTH A2C: 4.4 CM
SL CV LV EF: 55
SL CV PED ECHO LEFT VENTRICLE DIASTOLIC VOLUME (MOD BIPLANE) 2D: 95 ML
SL CV PED ECHO LEFT VENTRICLE SYSTOLIC VOLUME (MOD BIPLANE) 2D: 34 ML
TR MAX PG: 17 MMHG
TR PEAK VELOCITY: 2.1 M/S
TRICUSPID ANNULAR PLANE SYSTOLIC EXCURSION: 2.1 CM
TRICUSPID VALVE PEAK REGURGITATION VELOCITY: 2.07 M/S

## 2023-08-09 PROCEDURE — 93306 TTE W/DOPPLER COMPLETE: CPT | Performed by: INTERNAL MEDICINE

## 2023-08-09 PROCEDURE — 93356 MYOCRD STRAIN IMG SPCKL TRCK: CPT | Performed by: INTERNAL MEDICINE

## 2023-08-09 PROCEDURE — 93306 TTE W/DOPPLER COMPLETE: CPT

## 2023-08-09 PROCEDURE — 93356 MYOCRD STRAIN IMG SPCKL TRCK: CPT

## 2023-08-14 ENCOUNTER — TELEPHONE (OUTPATIENT)
Dept: FAMILY MEDICINE CLINIC | Facility: HOSPITAL | Age: 58
End: 2023-08-14

## 2023-08-14 ENCOUNTER — CLINICAL SUPPORT (OUTPATIENT)
Dept: NUTRITION | Facility: HOSPITAL | Age: 58
End: 2023-08-14
Payer: COMMERCIAL

## 2023-08-14 VITALS — BODY MASS INDEX: 32.81 KG/M2 | WEIGHT: 222.2 LBS

## 2023-08-14 DIAGNOSIS — R53.83 OTHER FATIGUE: ICD-10-CM

## 2023-08-14 PROCEDURE — 97802 MEDICAL NUTRITION INDIV IN: CPT | Performed by: DIETITIAN, REGISTERED

## 2023-08-14 NOTE — TELEPHONE ENCOUNTER
Patient had a visit today with dietitian they told her to make sure she had a referral and authorization in place.  Any questions please call pt at [phone]

## 2023-08-14 NOTE — PROGRESS NOTES
Nutrition Assessment Form    Patient Name: Aleena Diane    YOB: 1965    Sex: Female     Assessment Date: 8/14/2023  Start Time: 1115 Stop Time: 1215 Total Minutes: 61     Data:  Present at session: self   Parent/Patient Concerns/reason for visit: "Th=ey're trying to find out why I am fatigued all the time- one of them is potentially my diet"   Medical Dx/Reason for Referral: fatgiue   Past Medical History:   Diagnosis Date   • Disc displacement, thoracic     T6   • GERD (gastroesophageal reflux disease)    • Herniated cervical disc    • Paresthesia        Current Outpatient Medications   Medication Sig Dispense Refill   • Biotin 1 MG CAPS Take by mouth daily     • cholecalciferol (VITAMIN D3) 1,000 units tablet Take 1 tablet by mouth daily     • cyanocobalamin (VITAMIN B-12) 1,000 mcg tablet Take 1,000 mcg by mouth daily     • folic acid (FOLVITE) 1 mg tablet Take 1 tablet (1 mg total) by mouth daily (Patient taking differently: Take 1 mg by mouth daily prn) 30 tablet 0   • Lysine 1000 MG TABS Take 1 tablet by mouth daily     • magnesium oxide (MAG-OX) 400 mg Take 1 tablet (400 mg total) by mouth daily for 30 doses 30 tablet 0   • Synthroid 75 MCG tablet TAKE ONE TABLET BY MOUTH ONE TIME DAILY in the morning on an empty stomach 90 tablet 3   • zinc gluconate 50 mg tablet Take 50 mg by mouth daily       No current facility-administered medications for this visit.         Additional Meds/Supplements: n/a   Special Learning Needs/barriers to learning/any new barriers n/a   Height: HC Readings from Last 5 Encounters:   No data found for [facility].      Weight: Wt Readings from Last 10 Encounters:   08/09/23 100 kg (221 lb)   07/21/23 100 kg (221 lb)   07/14/23 99.8 kg (220 lb)   07/05/23 101 kg (223 lb)   06/12/23 98.4 kg (217 lb)   01/27/23 97.6 kg (215 lb 3.2 oz)   12/09/22 100 kg (221 lb)   11/18/22 100 kg (221 lb)   10/27/22 97.5 kg (215 lb)   09/29/22 96.2 kg (212 lb)     Estimated body mass index is 32.64 kg/m² as calculated from the following:    Height as of 8/9/23: 5' 9" (1.753 m). Weight as of 8/9/23: 100 kg (221 lb). Recent Weight Change: []Yes     [x]No  Amount:       Energy Needs: 1520cals 20cals/kg -500 cals for 1#/wk wt loss   Allergies   Allergen Reactions   • Levofloxacin Other (See Comments)     Leg pain   • Penicillin G Benzathine Other (See Comments)     Childhood reaction   • Penicillins Other (See Comments) and Hives     Childhood reaction  Childhood reaction  Childhood reaction      or intolerances NKFA- gluten sensitivy- peanuts might bother her- minimizes chocolate as well too much she gets nauseous   Social History     Substance and Sexual Activity   Alcohol Use Yes    Comment: social     _monthly maybe couple drinks- hard ciders limits beer for gluten   Social History     Tobacco Use   Smoking Status Never   Smokeless Tobacco Never       Who shops? 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 []Yes     [x]No  When:      Why:         Diet Hx:   Water intake only daily- approx 40-45oz daily- 8oz in the morning or 16oz  Can be as high as 80oz -  No seafood/fish meat/ no eggs  Cheese sometimes- mozzarella   Breakfast: Diet:  1030- - fruit- berry mix w/pineapple- x 2 cups (waking up at 7-730)  Maybe fig bar double pack    Lunch:  margurita pizza sometimes at office -beyond or [employer] GF option 330-4 brown rice - tofu x4oz and sauce- sweet n sour- does not really drink while she eats- will skip 1x every other week- sometimes add black beans or spinach or carrots      Dinner: 8-830-  Corn pasta or rice and corn pasta or tofu - spinach carrots cucumber quinoa      Snacks:  [place] butter  AM - [employer] fig bars- rice crackers   PM - mini kind bars 1-2 a wk   HS - potato or tortilla chips- every other week-     Other Notes/ Initial Assessment:  Gabbie Lutz is here to find out why she is fatigued and to see if her diet has anything to do with it, she started a more plant based diet about 4 years ago. She has been tested for celiac and been negative but feels she is still sensitive- she will get pain in her stomach /bloating etc when she eats gluten. She has also noted a decrease in reflux with plant based diet. She does have some reflux with olive oils or pasta sauce etc.    Discussed appropriate meals, snacks options, protein at each meal 2-5 oz, fluids throughout the day, timing of meals, importance of fruits and vegetables, getting adequate sleep and physical activity, and its impact on overall health and fatigue. Discussed ways to incorporate protein into meals. Provided Vegetarian Nutrition and wt loss tips from NCM and RD name and number for home use. Follow up scheduled for Oct 13th. Updated assessment (Follow up note only):       Nutrition Diagnosis:   Overweight/obesity  related to Excess energy intake as evidenced by  BMI more than normative standard for age and sex (obesity-grade I 32-30. 9)       Any change or new dx since previous visit:     Nutrition Diagnosis:   n/a      Medical Nutrition Therapy Intervention:  [x]Individualized Meal Plan-Discussed the importance of eating 3 meals daily and not skipping, and how metabolism is affected.   Also discussed adding in small snacks or 5-6 small meals daily if desired vs 3 larger ones, and appropriate options and portions. Appropriate timing of meals, including eating within 1 hr of waking and eating meals slowly 20mins/meals, minimizing mindless/boredom or habitual eating, etc was also mentioned. -Discussed the plate method of portioning foods, including half a plate fruits and vegetables or a half plate all vegetables, 1/4 of the plate a lean protein source or meat, and a 1/4 of the plate being a whole grain carb- usually 1/2-1c. This should be followed for at least 2 meals of the day, but could also be followed for all 3. []Understanding Lab Values   []Basic Pathophysiology of Disease []Food/Medication Interactions   []Food Diary [x]Exercise-150 mins of moderate activity weekly, discussed importance of variety of activity, including wt bearing activities 2-3x/wk x 10-15mins. Discussed importance of activity throughout the lifecycle and its impact on overall health/stress management, etc.   [x]Lifestyle/Behavior Modification Techniques-Discussed the importance of adequate sleep, and ideal sleeping conditions, including a cool temperature 64-68 degrees F, and a dark and quiet room. Also discussed the importance of a regular and consistent sleep routine, including minimizing blue light exposure an hour before sleeping. Weekend habits should include staying fairly consistent with weekday sleep habits to minimize disruption during the week. A connection was made between getting adequate and good quality sleep and the ability to handle stress the next day, make healthy food choices, and be active.  []Medication, Mechanism of Action   []Label Reading: CHO/ Na/ Fat/ other_________ []Self Blood Glucose Monitoring   [x]Weight/BMI Goals: gain/lose/maintain-  Would like to lose with goal wt 170-180# []Other -           Comprehension: []Excellent  []Very Good  [x]Good  []Fair   []Poor    Receptivity: []Excellent  []Very Good  [x]Good  []Fair   []Poor    Expected Compliance: []Excellent  []Very Good  [x]Good  []Fair   []Poor Goals (initial)/ Progress made on previous goals/new goals:  1.  3 meals daily no skipping   2.portions per plate method as discussed with RD   3. No follow-ups on file.   Labs:  CMP  Lab Results   Component Value Date    K 4.8 07/21/2023     07/21/2023    CO2 25 07/21/2023    BUN 13 07/21/2023    CREATININE 0.74 07/21/2023    AST 18 04/28/2023    ALT 14 04/28/2023    EGFR 95 07/21/2023       BMP  Lab Results   Component Value Date    K 4.8 07/21/2023    CO2 25 07/21/2023     07/21/2023    BUN 13 07/21/2023    CREATININE 0.74 07/21/2023       Lipids  Lab Results   Component Value Date    CHOL 161 06/13/2015     Lab Results   Component Value Date    HDL 78 09/01/2022    HDL 72 06/13/2015     Lab Results   Component Value Date    LDLCALC 69 09/01/2022    LDLCALC 74 06/13/2015     Lab Results   Component Value Date    TRIG 85 09/01/2022    TRIG 75 06/13/2015     Lab Results   Component Value Date    CHOLHDL 2.1 09/01/2022       Hemoglobin A1C  No results found for: "HGBA1C"    Fasting Glucose  No results found for: "GLUF"    Insulin     Thyroid  Lab Results   Component Value Date    TSH 0.756 07/21/2023       Hepatic Function Panel  Lab Results   Component Value Date    ALT 14 04/28/2023    AST 18 04/28/2023    GGT 9 05/02/2023       Celiac Disease Antibody Panel  No results found for: "ENDOMYSIAL IGA", "GLIADIN IGA", "GLIADIN IGG", "IGA", "TISSUE TRANSGLUT AB", "TTG IGA"   Iron  Lab Results   Component Value Date    IRON 64 04/28/2023    TIBC 226 (L) 12/27/2022    FERRITIN 499 (H) 05/02/2023            Sandra Alcazar, [address] [facility]  [address] [place] [id number]

## 2023-09-11 ENCOUNTER — OFFICE VISIT (OUTPATIENT)
Dept: FAMILY MEDICINE CLINIC | Facility: HOSPITAL | Age: 58
End: 2023-09-11
Payer: COMMERCIAL

## 2023-09-11 VITALS
DIASTOLIC BLOOD PRESSURE: 84 MMHG | HEART RATE: 70 BPM | SYSTOLIC BLOOD PRESSURE: 140 MMHG | WEIGHT: 221.4 LBS | BODY MASS INDEX: 32.79 KG/M2 | HEIGHT: 69 IN | TEMPERATURE: 96 F

## 2023-09-11 DIAGNOSIS — R07.9 CHEST PAIN, UNSPECIFIED TYPE: ICD-10-CM

## 2023-09-11 DIAGNOSIS — E06.3 HYPOTHYROIDISM DUE TO HASHIMOTO'S THYROIDITIS: ICD-10-CM

## 2023-09-11 DIAGNOSIS — E03.8 HYPOTHYROIDISM DUE TO HASHIMOTO'S THYROIDITIS: ICD-10-CM

## 2023-09-11 DIAGNOSIS — R20.2 PARESTHESIA: ICD-10-CM

## 2023-09-11 DIAGNOSIS — R53.82 CHRONIC FATIGUE: Primary | ICD-10-CM

## 2023-09-11 PROCEDURE — 99214 OFFICE O/P EST MOD 30 MIN: CPT | Performed by: INTERNAL MEDICINE

## 2023-09-11 NOTE — PROGRESS NOTES
Name: Ana Hooper      : 1965      MRN: [id number]  Encounter Provider: García Shelley DO  Encounter Date: 2023   Encounter department: [address]     1. Chronic fatigue  Assessment & Plan:  Unchanged, Lyme and acute labs nml, Echo nml, will check exercise stress and follow, call w/new or worse symptoms    Orders:  -     Stress test only, exercise; Future; Expected date: 2023  -     CBC and differential  -     Comprehensive metabolic panel  -     Lipid panel  -     TSH, 3rd generation with Free T4 reflex    2. Chest pain, unspecified type  Comments:  Persisting and sounding more exertional today, Echo EF 55% and no wall motion abnormality, insurance denied myoview, will check exercise stress & recheck 3 mo, to ED with red flag CV symptoms  Orders:  -     Stress test only, exercise; Future; Expected date: 2023  -     CBC and differential  -     Comprehensive metabolic panel  -     Lipid panel  -     TSH, 3rd generation with Free T4 reflex    3. Paresthesia  Assessment & Plan:  Persisting, neg neuropathy labs, EMG  showed B/L peroneal neuropathy, will recheck EMG and follow    Orders:  -     CBC and differential  -     Comprehensive metabolic panel  -     Lipid panel  -     TSH, 3rd generation with Free T4 reflex  -     EMG 2 limb lower extremity; Future    4. Hypothyroidism due to Hashimoto's thyroiditis  Assessment & Plan:  BW in Sept - order given, con't current Synthroid for now    Orders:  -     CBC and differential  -     Comprehensive metabolic panel  -     Lipid panel  -     TSH, 3rd generation with Free T4 reflex  -     EMG 2 limb lower extremity; Future      Colonoscopy 12/15 - 10 yr    Mammo     PAP  - 5 yrs    FLP   BW       Subjective      HPI Pt here for follow up appt    Last visit in July pt was noting atypical and chronic CP and fatigue. ECG was done in office with NS ST-T wave changes. She was subsequently sent for Echo and stress test and BW was ordered as well. Echo showed EF 55% and no regional wall motion abnormality. Mild TR was present. Stress test was not done as insurance denied it. BW showed nml Lyme/TSH/CBC/BMP. She did see a nutritionist in Aug as well. She felt the nutritionist helped her realize she is not eating enough protein. She is working on that. She con't to feel fatigued but is still walking daily. She con't to feel chest pains on occasion. She feels they primarily occur when she is doing things. The pain does radiate to her LUE at times. She feels winded quickly going up and down stairs but has no other associated symptoms. She con't to note numbness and tingling in her B/L LE. 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 Review of Systems   Constitutional: Negative for chills and fever. Eyes: Negative for pain and visual disturbance. Respiratory: Positive for shortness of breath. Negative for cough. Cardiovascular: Positive for chest pain. Negative for palpitations and leg swelling. Gastrointestinal: Negative for abdominal pain, diarrhea, nausea and vomiting. Genitourinary: Negative for difficulty urinating and dysuria. Musculoskeletal: Negative for back pain, gait problem and neck pain. Skin: Negative for rash and wound. Neurological: Positive for numbness. Negative for dizziness and headaches. Hematological: Negative for adenopathy. Psychiatric/Behavioral: Negative for confusion and dysphoric mood.        Current Outpatient Medications on File Prior to Visit   Medication Sig   • Biotin 1 MG CAPS Take by mouth daily   • cholecalciferol (VITAMIN D3) 1,000 units tablet Take 1 tablet by mouth daily   • cyanocobalamin (VITAMIN B-12) 1,000 mcg tablet Take 1,000 mcg by mouth daily   • folic acid (FOLVITE) 1 mg tablet Take 1 tablet (1 mg total) by mouth daily (Patient taking differently: Take 1 mg by mouth daily prn)   • Lysine 1000 MG TABS Take 1 tablet by mouth daily   • magnesium oxide (MAG-OX) 400 mg Take 1 tablet (400 mg total) by mouth daily for 30 doses   • Synthroid 75 MCG tablet TAKE ONE TABLET BY MOUTH ONE TIME DAILY in the morning on an empty stomach   • zinc gluconate 50 mg tablet Take 50 mg by mouth daily       Objective     /84   Pulse 70   Temp (!) 96 °F (35.6 °C) (Tympanic)   Ht 5' 9" (1.753 m)   Wt 100 kg (221 lb 6.4 oz)   BMI 32.70 kg/m²     Physical Exam  Vitals and nursing note reviewed. Constitutional:       General: She is not in acute distress. Appearance: She is well-developed. She is not ill-appearing. HENT:      Head: Normocephalic and atraumatic. Eyes:      General:         Right eye: No discharge. Left eye: No discharge. Conjunctiva/sclera: Conjunctivae normal.   Neck:      Trachea: No tracheal deviation. Cardiovascular:      Rate and Rhythm: Normal rate and regular rhythm. Heart sounds: Normal heart sounds. No murmur heard. No friction rub. Pulmonary:      Effort: Pulmonary effort is normal. No respiratory distress. Breath sounds: Normal breath sounds. No wheezing, rhonchi or rales. Musculoskeletal:         General: No deformity or signs of injury. Cervical back: Neck supple. Skin:     General: Skin is warm. Coloration: Skin is not pale. Findings: No rash. Neurological:      General: No focal deficit present. Mental Status: She is alert. Motor: No abnormal muscle tone. Gait: Gait normal.   Psychiatric:         Mood and Affect: Mood normal.         Behavior: Behavior normal.         Thought Content:  Thought content normal.         Judgment: Judgment normal.       León Givens DO

## 2023-09-11 NOTE — ASSESSMENT & PLAN NOTE
Unchanged, Lyme and acute labs nml, Echo nml, will check exercise stress and follow, call w/new or worse symptoms

## 2023-09-11 NOTE — ASSESSMENT & PLAN NOTE
Persisting, neg neuropathy labs, EMG 2016 showed B/L peroneal neuropathy, will recheck EMG and follow

## 2023-10-17 ENCOUNTER — HOSPITAL ENCOUNTER (OUTPATIENT)
Dept: NON INVASIVE DIAGNOSTICS | Age: 58
Discharge: HOME | End: 2023-10-17

## 2023-10-31 ENCOUNTER — HOSPITAL ENCOUNTER (OUTPATIENT)
Dept: NON INVASIVE DIAGNOSTICS | Age: 58
Discharge: HOME | End: 2023-10-31
Payer: COMMERCIAL

## 2023-10-31 DIAGNOSIS — R53.82 CHRONIC FATIGUE: ICD-10-CM

## 2023-10-31 DIAGNOSIS — R07.9 CHEST PAIN, UNSPECIFIED TYPE: ICD-10-CM

## 2023-10-31 LAB
CHEST PAIN STATEMENT: NORMAL
MAX DIASTOLIC BP: 80 MMHG
MAX HR PERCENT: 107 %
MAX HR: 176 BPM
MAX PREDICTED HEART RATE: 164 BPM
PROTOCOL NAME: NORMAL
RATE PRESSURE PRODUCT: NORMAL
REASON FOR TERMINATION: NORMAL
SL CV STRESS STAGE REACHED: 3
STRESS ANGINA INDEX: 0
STRESS BASELINE HR: 93 BPM
STRESS PEAK HR: 176 BPM
STRESS POST ESTIMATED WORKLOAD: 10.1 METS
STRESS POST EXERCISE DUR MIN: 9 MIN
STRESS POST EXERCISE DUR MIN: 9 MIN
STRESS POST EXERCISE DUR SEC: 0 SEC
STRESS POST EXERCISE DUR SEC: 0 SEC
STRESS POST PEAK BP: 148 MMHG
STRESS POST PEAK HR: 176 BPM
STRESS POST PEAK SYSTOLIC BP: 148 MMHG
TARGET HR FORMULA: NORMAL
TEST INDICATION: NORMAL

## 2023-10-31 PROCEDURE — 93016 CV STRESS TEST SUPVJ ONLY: CPT | Performed by: INTERNAL MEDICINE

## 2023-10-31 PROCEDURE — 93018 CV STRESS TEST I&R ONLY: CPT | Performed by: INTERNAL MEDICINE

## 2023-10-31 PROCEDURE — 93017 CV STRESS TEST TRACING ONLY: CPT

## 2023-12-21 LAB
ALBUMIN SERPL-MCNC: 4.2 G/DL (ref 3.8–4.9)
ALBUMIN/GLOB SERPL: 2.1 {RATIO} (ref 1.2–2.2)
ALP SERPL-CCNC: 78 IU/L (ref 44–121)
ALT SERPL-CCNC: 15 IU/L (ref 0–32)
AST SERPL-CCNC: 15 IU/L (ref 0–40)
BASOPHILS # BLD AUTO: 0 X10E3/UL (ref 0–0.2)
BASOPHILS NFR BLD AUTO: 1 %
BILIRUB SERPL-MCNC: 0.6 MG/DL (ref 0–1.2)
BUN SERPL-MCNC: 11 MG/DL (ref 6–24)
BUN/CREAT SERPL: 15 (ref 9–23)
CALCIUM SERPL-MCNC: 9.2 MG/DL (ref 8.7–10.2)
CHLORIDE SERPL-SCNC: 104 MMOL/L (ref 96–106)
CHOLEST SERPL-MCNC: 164 MG/DL (ref 100–199)
CHOLEST/HDLC SERPL: 2.4 RATIO (ref 0–4.4)
CO2 SERPL-SCNC: 23 MMOL/L (ref 20–29)
CREAT SERPL-MCNC: 0.73 MG/DL (ref 0.57–1)
EGFR: 96 ML/MIN/1.73
EOSINOPHIL # BLD AUTO: 0 X10E3/UL (ref 0–0.4)
EOSINOPHIL NFR BLD AUTO: 1 %
ERYTHROCYTE [DISTWIDTH] IN BLOOD BY AUTOMATED COUNT: 11.3 % (ref 11.7–15.4)
GLOBULIN SER-MCNC: 2 G/DL (ref 1.5–4.5)
GLUCOSE SERPL-MCNC: 98 MG/DL (ref 70–99)
HCT VFR BLD AUTO: 38.8 % (ref 34–46.6)
HDLC SERPL-MCNC: 69 MG/DL
HGB BLD-MCNC: 12.8 G/DL (ref 11.1–15.9)
IMM GRANULOCYTES # BLD: 0 X10E3/UL (ref 0–0.1)
IMM GRANULOCYTES NFR BLD: 0 %
LDLC SERPL CALC-MCNC: 79 MG/DL (ref 0–99)
LYMPHOCYTES # BLD AUTO: 1.3 X10E3/UL (ref 0.7–3.1)
LYMPHOCYTES NFR BLD AUTO: 22 %
MCH RBC QN AUTO: 30.8 PG (ref 26.6–33)
MCHC RBC AUTO-ENTMCNC: 33 G/DL (ref 31.5–35.7)
MCV RBC AUTO: 93 FL (ref 79–97)
MONOCYTES # BLD AUTO: 0.4 X10E3/UL (ref 0.1–0.9)
MONOCYTES NFR BLD AUTO: 7 %
NEUTROPHILS # BLD AUTO: 3.9 X10E3/UL (ref 1.4–7)
NEUTROPHILS NFR BLD AUTO: 69 %
PLATELET # BLD AUTO: 211 X10E3/UL (ref 150–450)
POTASSIUM SERPL-SCNC: 4 MMOL/L (ref 3.5–5.2)
PROT SERPL-MCNC: 6.2 G/DL (ref 6–8.5)
RBC # BLD AUTO: 4.16 X10E6/UL (ref 3.77–5.28)
SL AMB VLDL CHOLESTEROL CALC: 16 MG/DL (ref 5–40)
SODIUM SERPL-SCNC: 139 MMOL/L (ref 134–144)
T4 FREE SERPL-MCNC: 1.32 NG/DL (ref 0.82–1.77)
TRIGL SERPL-MCNC: 87 MG/DL (ref 0–149)
TSH SERPL DL<=0.005 MIU/L-ACNC: 1.05 UIU/ML (ref 0.45–4.5)
TSH SERPL DL<=0.005 MIU/L-ACNC: 1.07 UIU/ML (ref 0.45–4.5)
WBC # BLD AUTO: 5.7 X10E3/UL (ref 3.4–10.8)

## 2023-12-22 ENCOUNTER — OFFICE VISIT (OUTPATIENT)
Dept: ENDOCRINOLOGY | Facility: HOSPITAL | Age: 58
End: 2023-12-22
Payer: COMMERCIAL

## 2023-12-22 VITALS
BODY MASS INDEX: 34.96 KG/M2 | DIASTOLIC BLOOD PRESSURE: 78 MMHG | HEIGHT: 69 IN | WEIGHT: 236 LBS | HEART RATE: 86 BPM | SYSTOLIC BLOOD PRESSURE: 122 MMHG

## 2023-12-22 DIAGNOSIS — E03.9 HYPOTHYROIDISM, UNSPECIFIED TYPE: ICD-10-CM

## 2023-12-22 DIAGNOSIS — E06.3 HYPOTHYROIDISM DUE TO HASHIMOTO'S THYROIDITIS: Primary | ICD-10-CM

## 2023-12-22 DIAGNOSIS — E04.2 GOITER, NONTOXIC, MULTINODULAR: ICD-10-CM

## 2023-12-22 DIAGNOSIS — E03.8 HYPOTHYROIDISM DUE TO HASHIMOTO'S THYROIDITIS: Primary | ICD-10-CM

## 2023-12-22 PROCEDURE — 99214 OFFICE O/P EST MOD 30 MIN: CPT | Performed by: INTERNAL MEDICINE

## 2023-12-22 RX ORDER — LEVOTHYROXINE SODIUM 75 MCG
75 TABLET ORAL
Qty: 90 TABLET | Refills: 3 | Status: SHIPPED | OUTPATIENT
Start: 2023-12-22

## 2023-12-22 NOTE — ASSESSMENT & PLAN NOTE
Last ultrasound was 2016. She did not yet get her ultrasound for this visit. I have reordered her ultrasound to be done at her earliest convenience.

## 2023-12-22 NOTE — PATIENT INSTRUCTIONS
The thyroid blood work is back to normal.     Continue the same synthroid 75 mcg daily.     Get the thyroid ultrasound when able.     Follow up in 1 year with blood work.

## 2023-12-22 NOTE — PROGRESS NOTES
12/23/2023    Assessment/Plan   1. Hypothyroidism due to Hashimoto's thyroiditis  Assessment & Plan:    Most recent thyroid function studies are normal. She is biochemically euthyroid. We will continue the same Synthroid 75 mcg daily.    Orders:  -     US thyroid; Future; Expected date: 12/22/2023  -     T4, free Lab Collect; Future; Expected date: 11/11/2024  -     TSH, 3rd generation Lab Collect; Future; Expected date: 11/11/2024  -     T4, free Lab Collect  -     TSH, 3rd generation Lab Collect    2. Goiter, nontoxic, multinodular  Assessment & Plan:  Last ultrasound was 2016. She did not yet get her ultrasound for this visit. I have reordered her ultrasound to be done at her earliest convenience.    Orders:  -     US thyroid; Future; Expected date: 12/22/2023  -     T4, free Lab Collect; Future; Expected date: 11/11/2024  -     TSH, 3rd generation Lab Collect; Future; Expected date: 11/11/2024  -     T4, free Lab Collect  -     TSH, 3rd generation Lab Collect    3. Hypothyroidism, unspecified type  -     Synthroid 75 MCG tablet; Take 1 tablet (75 mcg total) by mouth daily in the early morning           I have asked her to follow up in 1 year with preceding TSH and free T4.    CC: Hypothyroidism , thyroid nodule/goiter Follow up     HPI: Anuradha Min is a 58-year-old female with history of hypothyroidism due to Hashimoto's thyroiditis and history of small thyroid nodule in the setting of a multinodular goiter for a follow-up visit.     She was diagnosed with Hashimoto's disease in her 20s and has been on thyroid hormone for replacement purposes ever since. Thyroid nodules were noted in 2009. She has been followed with serial ultrasounds. The last ultrasound was performed in 2016, showing a small sub- centimeter thyroid nodule that was slightly smaller.     The patient is currently taking Synthroid 75 mcg daily. She is doing well from a thyroid perspective, although she has been feeling fatigued in general,  and she is uncertain whether her fatigue is related to her thyroid health. She has undergone some tests and stress tests, but they have been unable to determine the cause. She started on CPAP, but it has not provided relief or shown improvement. She has gotten rid of the chronic headaches that she had. She states that her quality of sleep varies depending on how well her mask is functioning. She recently recovered from a cold, but she is still experiencing nasal congestion. She believes that her CPAP might be exacerbating the issue because it is exerting pressure in that area. She states that she always experiences sinus sensitivity and has a deviated septum. She notes that while using CPAP, she struggles with heat intolerance, particularly when the room temperature increases, resulting to excessive congestion. She experiences a sudden surge in pressure from the CPAP machine causing her to wake up and find her mask leaking all over her face, disrupting her sleep. She has gained 15 pounds since the last visit.     She encounters heat insensitivity in the winter. She experiences heart racing and palpitations a few times a week. She has mild tremors in her left hand. She has mild dyspnea and chest pain in the left upper chest and has been attempting to determine the cause. She does not have diarrhea but has occasional constipation when her dietary fiber intake is insufficient.     She has mild dry skin, experiences excessive nail breakage, and her hair loss has not been as severe this year. She reports issues with her right foot, particularly dryness, and the big toe has a fungal infection. She has ceased to have a menstrual cycle. She encounters a sharp pain in the left side of her neck and intermittently senses food becoming lodged in the lower neck region, and to alleviate this, she either massages her neck or drinks water to dislodge the trapped food. She additionally mentions that it is painful when the food is  moving down her neck. She experiences spontaneous choking incidents, particularly when drinking water, as it often seems to go down the wrong way. She had esophageal dilation 8 years ago.     She has never tried generic Synthroid. She is expressing hesitancy towards taking generic medications due to her family's history of not responding well to several types of medication, especially generic ones. She reports that her father had a severe adverse reaction to levothyroxine medication.       Review of Systems  The pertinent positive and negative findings are as noted in the HPI.    Historical Information   Past Medical History:   Diagnosis Date    Disc displacement, thoracic     T6    GERD (gastroesophageal reflux disease)     Herniated cervical disc     Paresthesia      Past Surgical History:   Procedure Laterality Date    BREAST CYST EXCISION Right 2021    Benign    COLONOSCOPY  12/28/2015    Rectal polyp removed- hyperplastic polyp; repeat in 10 years     DENTAL SURGERY      teeth extractions, wisdom teeth    ESOPHAGEAL DILATION      ESOPHAGOGASTRODUODENOSCOPY  04/25/2016    KNEE ARTHROSCOPY Left     MAMMO STEREOTACTIC BREAST BIOPSY RIGHT (ALL INC) Right 5/13/2021     Social History   Social History     Substance and Sexual Activity   Alcohol Use Yes    Comment: social      Social History     Substance and Sexual Activity   Drug Use Not Currently     Social History     Tobacco Use   Smoking Status Never   Smokeless Tobacco Never     Family History:   Family History   Problem Relation Age of Onset    Atrial fibrillation Mother     Diabetes type II Mother     Skin cancer Father         60's and its ongoing    Colonic polyp Father     Prostate cancer Father 60    Mitral valve prolapse Sister     Skin cancer Sister         40's    No Known Problems Maternal Grandmother     No Known Problems Maternal Grandfather     No Known Problems Paternal Grandmother     No Known Problems Paternal Grandfather     Skin cancer Brother  "        50's    No Known Problems Maternal Aunt     No Known Problems Maternal Aunt     Uterine cancer Maternal Aunt         70's    Cervical cancer Paternal Aunt 60    No Known Problems Paternal Aunt     No Known Problems Paternal Aunt     No Known Problems Paternal Aunt     Arrhythmia Family     Thyroid disease Family     Colonic polyp Family     Lupus Family        Meds/Allergies   Current Outpatient Medications   Medication Sig Dispense Refill    Biotin 1 MG CAPS Take by mouth daily      cholecalciferol (VITAMIN D3) 1,000 units tablet Take 1 tablet by mouth daily      cyanocobalamin (VITAMIN B-12) 1,000 mcg tablet Take 1,000 mcg by mouth daily      Lysine 1000 MG TABS Take 1 tablet by mouth daily      magnesium oxide (MAG-OX) 400 mg Take 1 tablet (400 mg total) by mouth daily for 30 doses 30 tablet 0    Synthroid 75 MCG tablet Take 1 tablet (75 mcg total) by mouth daily in the early morning 90 tablet 3    zinc gluconate 50 mg tablet Take 50 mg by mouth daily      folic acid (FOLVITE) 1 mg tablet Take 1 tablet (1 mg total) by mouth daily (Patient not taking: Reported on 12/22/2023) 30 tablet 0     No current facility-administered medications for this visit.     Allergies   Allergen Reactions    Levofloxacin Other (See Comments)     Leg pain    Penicillin G Benzathine Other (See Comments)     Childhood reaction    Penicillins Other (See Comments) and Hives     Childhood reaction  Childhood reaction  Childhood reaction         Objective   Vitals: Blood pressure 122/78, pulse 86, height 5' 9\" (1.753 m), weight 107 kg (236 lb).  Invasive Devices       Peripheral Intravenous Line  Duration             Peripheral IV 06/21/23 Left;Proximal;Ventral (anterior) Antecubital 185 days                    Physical Exam    Physical exam normal with pertinent positives and negatives.  Eyes: No lid lags, stare, proptosis, or periorbital edema.  Neck exam: Demonstrates a normal size thyroid without palpable nodules. Tenderness on " the left side of the neck to palpation just below the thyroid gland.  Respiratory: Lungs are clear.  Cardiovascular: Heart regular. No murmurs.  Neurological: No tremor of the outstretched hands. Patellar deep tendon reflexes normal.      Lab Results:      Lab Results   Component Value Date    CREATININE 0.73 12/20/2023    CREATININE 0.74 07/21/2023    CREATININE 0.63 09/01/2022    BUN 11 12/20/2023    K 4.0 12/20/2023     12/20/2023    CO2 23 12/20/2023     eGFR   Date Value Ref Range Status   12/20/2023 96 >59 mL/min/1.73 Final         Lab Results   Component Value Date    CHOL 161 06/13/2015    HDL 69 12/20/2023    TRIG 87 12/20/2023    CHOLHDL 2.4 12/20/2023       Lab Results   Component Value Date    ALT 15 12/20/2023    AST 15 12/20/2023    GGT 9 05/02/2023       Lab Results   Component Value Date    TSH 1.070 12/20/2023    FREET4 1.32 12/20/2023         Blood work performed on 12/20/2023 showed a TSH of 1.07 with a free T4 of 1.32.    Future Appointments   Date Time Provider Department Center   12/29/2023  5:30 PM [facility] US 1 [facility] US  [facility]   1/12/2024  8:40 AM [facility]   12/27/2024  8:00 AM Nika Bone MD ENDO  [facility]       Transcribed for Nika Bone MD, by Quiat Baxter on 12/23/23 at 1:02 PM. Powered by Dragon [employer].

## 2023-12-22 NOTE — ASSESSMENT & PLAN NOTE
Most recent thyroid function studies are normal. She is biochemically euthyroid. We will continue the same Synthroid 75 mcg daily.

## 2023-12-29 ENCOUNTER — HOSPITAL ENCOUNTER (OUTPATIENT)
Dept: ULTRASOUND IMAGING | Facility: HOSPITAL | Age: 58
End: 2023-12-29
Attending: INTERNAL MEDICINE
Payer: COMMERCIAL

## 2023-12-29 DIAGNOSIS — E04.2 GOITER, NONTOXIC, MULTINODULAR: ICD-10-CM

## 2023-12-29 DIAGNOSIS — E03.8 HYPOTHYROIDISM DUE TO HASHIMOTO'S THYROIDITIS: ICD-10-CM

## 2023-12-29 DIAGNOSIS — E06.3 HYPOTHYROIDISM DUE TO HASHIMOTO'S THYROIDITIS: ICD-10-CM

## 2023-12-29 PROCEDURE — 76536 US EXAM OF HEAD AND NECK: CPT

## 2024-01-09 DIAGNOSIS — E04.2 GOITER, NONTOXIC, MULTINODULAR: Primary | ICD-10-CM

## 2024-03-11 ENCOUNTER — TELEPHONE (OUTPATIENT)
Dept: ENDOCRINOLOGY | Facility: HOSPITAL | Age: 59
End: 2024-03-11

## 2024-03-11 NOTE — TELEPHONE ENCOUNTER
Patient called, she now has [employer] and insurance is requiring a prior authorization for the Synthroid. Patient takes brand because her family had a severe reaction to generic.    [employer]  ID: [id number]  Rx Bin: [id number]  Rx PCN: [id number]  Rx Grp: [id number]

## 2024-03-14 NOTE — TELEPHONE ENCOUNTER
Samantha Min (Key: [id number])  PA Case ID #: [id number]  Rx #: [id number]  Need Help? Call us at [phone]  Status  Sent to Plan today  Drug  Synthroid 75MCG tablets    Form  [employer] Commercial Electronic PA Form (2017 NCPDP)  Original Claim Info  70

## 2024-12-13 ENCOUNTER — HOSPITAL ENCOUNTER (OUTPATIENT)
Dept: ULTRASOUND IMAGING | Facility: HOSPITAL | Age: 59
End: 2024-12-13
Payer: COMMERCIAL

## 2024-12-13 DIAGNOSIS — E04.2 GOITER, NONTOXIC, MULTINODULAR: ICD-10-CM

## 2024-12-13 PROCEDURE — 76536 US EXAM OF HEAD AND NECK: CPT

## 2024-12-19 ENCOUNTER — RESULTS FOLLOW-UP (OUTPATIENT)
Dept: ENDOCRINOLOGY | Facility: HOSPITAL | Age: 59
End: 2024-12-19

## 2024-12-20 ENCOUNTER — OFFICE VISIT (OUTPATIENT)
Dept: ENDOCRINOLOGY | Facility: HOSPITAL | Age: 59
End: 2024-12-20
Payer: COMMERCIAL

## 2024-12-20 VITALS
WEIGHT: 228.2 LBS | DIASTOLIC BLOOD PRESSURE: 72 MMHG | OXYGEN SATURATION: 100 % | HEIGHT: 69 IN | HEART RATE: 86 BPM | BODY MASS INDEX: 33.8 KG/M2 | SYSTOLIC BLOOD PRESSURE: 108 MMHG

## 2024-12-20 DIAGNOSIS — R53.82 CHRONIC FATIGUE: ICD-10-CM

## 2024-12-20 DIAGNOSIS — E06.3 HYPOTHYROIDISM DUE TO HASHIMOTO'S THYROIDITIS: Primary | ICD-10-CM

## 2024-12-20 DIAGNOSIS — E04.2 GOITER, NONTOXIC, MULTINODULAR: ICD-10-CM

## 2024-12-20 DIAGNOSIS — E55.9 VITAMIN D DEFICIENCY: ICD-10-CM

## 2024-12-20 DIAGNOSIS — E03.9 HYPOTHYROIDISM, UNSPECIFIED TYPE: ICD-10-CM

## 2024-12-20 PROCEDURE — 99214 OFFICE O/P EST MOD 30 MIN: CPT | Performed by: PHYSICIAN ASSISTANT

## 2024-12-20 RX ORDER — LEVOTHYROXINE SODIUM 75 MCG
75 TABLET ORAL
Qty: 90 TABLET | Refills: 3 | Status: SHIPPED | OUTPATIENT
Start: 2024-12-20

## 2024-12-20 NOTE — PROGRESS NOTES
Anuradha Min 59 y.o. female MRN: [id number]    Encounter: [id number]      Assessment & Plan     Assessment:  This is a 59 y.o.-year-old female with hypothyroidism and thyroid nodules.    Plan:  1.  Hypothyroidism due to Hashimoto's thyroiditis: No thyroid lab work was completed prior to today's office visit.  That being said review of previous thyroid lab work shows that she has been consistent on the same dose of her medication.  She will continue with Synthroid 75 mcg daily.  Once lab results are in we will see if adjustment needs to be made.  If there is any concerns or questions or change in symptoms over the next year, please contact the office.  Follow-up in 1 year with labwork completed prior to visit.     2.  Thyroid nodules: In general thyroid ultrasound looks to be stable.  Of the 3 most recent thyroid ultrasounds they have commented on different nodules, and this is likely due to her Hashimoto's.  That being said I would like to repeat a thyroid ultrasound in 1 year.  She does have concerns of neck discomfort which could be inflammation of the thyroid that comes and goes.  It could be beneficial in the future to order a CT of the neck for further evaluation.     3.  Chronic fatigue: At this time her symptoms are likely not due to her thyroid.  Will check pituitary and adrenal function just to make sure there is not any concerns.  Would strongly recommend following up with PCP for further evaluation.    CC: Hypothyroidism follow-up    History of Present Illness     HPI:  Anuradha Min is a 56 year old female with history of hypothyroidism due to Hashimoto's thyroiditis with history of small thyroid nodule in the setting of a multinodular goiter for follow-up visit.       She was diagnosed with Hashimoto's thyroid disease in her 20s.  She has been on thyroid hormone for replacement purposes ever since.  She is currently taking brand-name Synthroid 75 mcg daily.  Has been on the same dose for  several years. She will have some moments of sweating but mostly is cold.  She denies heat intolerance.  She has fatigue this has been going on for years and is unchanged.  Is been diagnosed with sleep apnea and utilizes a CPAP.  Has had some minor improvement in her sleeping habits. She has some feelings of anxiety and nervousness with some tremors.  She has no depression.  She denies brittle nails or hair loss but has chronic dry skin.  She denies diarrhea or constipation.  Weight is 8 pound less than last year.  She denies diplopia.  She denies compressive thyroid symptoms and has unchanged swallowing issues and anterior neck discomfort.     Thyroid nodules were noted in 2009.  Last 2 years she has been having serial ultrasounds for the thyroid nodule.  Most recent ultrasound commented on a right subcentimeter nodule.  In the past there has been a left subcentimeter nodule.  She does have Hashimoto's thyroiditis which could be a reason for these results.  Most recent thyroid ultrasound was completed December 13, 2024 and revealed thyroid was relatively stable.    Review of Systems   Constitutional:  Positive for fatigue. Negative for activity change, appetite change and unexpected weight change.   HENT:  Negative for sore throat and trouble swallowing.         Neck discomfort   Eyes:  Negative for visual disturbance.   Respiratory:  Negative for chest tightness and shortness of breath.    Cardiovascular:  Negative for chest pain, palpitations and leg swelling.   Gastrointestinal:  Negative for abdominal pain, constipation, diarrhea, nausea and vomiting.   Endocrine: Negative for cold intolerance, heat intolerance, polydipsia, polyphagia and polyuria.   Genitourinary:  Negative for frequency.   Skin:  Negative for wound.   Neurological:  Negative for dizziness, weakness, numbness and headaches.   Psychiatric/Behavioral:  Negative for dysphoric mood and sleep disturbance. The patient is not nervous/anxious.         Historical Information   Past Medical History:   Diagnosis Date   • Disc displacement, thoracic     T6   • GERD (gastroesophageal reflux disease)    • Herniated cervical disc    • Paresthesia      Past Surgical History:   Procedure Laterality Date   • BREAST CYST EXCISION Right 2021    Benign   • COLONOSCOPY  12/28/2015    Rectal polyp removed- hyperplastic polyp; repeat in 10 years    • DENTAL SURGERY      teeth extractions, wisdom teeth   • ESOPHAGEAL DILATION     • ESOPHAGOGASTRODUODENOSCOPY  04/25/2016   • KNEE ARTHROSCOPY Left    • MAMMO STEREOTACTIC BREAST BIOPSY RIGHT (ALL INC) Right 5/13/2021     Social History   Social History     Substance and Sexual Activity   Alcohol Use Yes    Comment: social      Social History     Substance and Sexual Activity   Drug Use Not Currently     Social History     Tobacco Use   Smoking Status Never   Smokeless Tobacco Never     Family History:   Family History   Problem Relation Age of Onset   • Atrial fibrillation Mother    • Diabetes type II Mother    • Skin cancer Father         60's and its ongoing   • Colonic polyp Father    • Prostate cancer Father 60   • Mitral valve prolapse Sister    • Skin cancer Sister         40's   • No Known Problems Maternal Grandmother    • No Known Problems Maternal Grandfather    • No Known Problems Paternal Grandmother    • No Known Problems Paternal Grandfather    • Skin cancer Brother         50's   • No Known Problems Maternal Aunt    • No Known Problems Maternal Aunt    • Uterine cancer Maternal Aunt         70's   • Cervical cancer Paternal Aunt 60   • No Known Problems Paternal Aunt    • No Known Problems Paternal Aunt    • No Known Problems Paternal Aunt    • Arrhythmia Family    • Thyroid disease Family    • Colonic polyp Family    • Lupus Family        Meds/Allergies   Current Outpatient Medications   Medication Sig Dispense Refill   • Biotin 1 MG CAPS Take by mouth daily     • cholecalciferol (VITAMIN D3) 1,000 units tablet  "Take 1 tablet by mouth daily     • cyanocobalamin (VITAMIN B-12) 1,000 mcg tablet Take 1,000 mcg by mouth daily     • folic acid (FOLVITE) 1 mg tablet Take 1 tablet (1 mg total) by mouth daily 30 tablet 0   • Lysine 1000 MG TABS Take 1 tablet by mouth daily     • magnesium oxide (MAG-OX) 400 mg Take 1 tablet (400 mg total) by mouth daily for 30 doses 30 tablet 0   • Synthroid 75 MCG tablet Take 1 tablet (75 mcg total) by mouth daily in the early morning 90 tablet 3   • zinc gluconate 50 mg tablet Take 50 mg by mouth daily       No current facility-administered medications for this visit.     Allergies   Allergen Reactions   • Levofloxacin Other (See Comments)     Leg pain   • Penicillin G Benzathine Other (See Comments)     Childhood reaction   • Penicillins Other (See Comments) and Hives     Childhood reaction  Childhood reaction  Childhood reaction         Objective   Vitals: Blood pressure 108/72, pulse 86, height 5' 9\" (1.753 m), weight 104 kg (228 lb 3.2 oz), SpO2 100%.    Physical Exam  Vitals and nursing note reviewed.   Constitutional:       General: She is not in acute distress.     Appearance: Normal appearance. She is not diaphoretic.   HENT:      Head: Normocephalic and atraumatic.   Eyes:      General: No scleral icterus.     Extraocular Movements: Extraocular movements intact.      Conjunctiva/sclera: Conjunctivae normal.      Pupils: Pupils are equal, round, and reactive to light.   Cardiovascular:      Rate and Rhythm: Normal rate and regular rhythm.      Heart sounds: No murmur heard.  Pulmonary:      Effort: Pulmonary effort is normal. No respiratory distress.      Breath sounds: Normal breath sounds. No wheezing.   Musculoskeletal:      Cervical back: Normal range of motion.      Right lower leg: No edema.      Left lower leg: No edema.   Lymphadenopathy:      Cervical: No cervical adenopathy.   Neurological:      Mental Status: She is alert and oriented to person, place, and time. Mental status " "is at baseline.      Sensory: No sensory deficit.      Gait: Gait normal.   Psychiatric:         Mood and Affect: Mood normal.         Behavior: Behavior normal.         Thought Content: Thought content normal.         The history was obtained from the review of the chart, patient.    Lab Results:        Imaging Studies:   Results for orders placed during the hospital encounter of 12/13/24    US thyroid    Impression  Hyperemic heterogeneous thyroid gland suggestive of thyroiditis. Correlate with clinical findings.    No nodule meets current ACR criteria for requiring biopsy or follow-up ultrasounds.        Reference: ACR Thyroid Imaging, Reporting and Data System (TI-RADS): White Paper of the ACR TI-RADS Committee. J AM Nirmal Radiol 2017;14:587-595. Additional recommendations based on American Thyroid Association 2015 guidelines.      Workstation performed: [id number]      Results Review Statement: I reviewed radiology reports from this admission including: Ultrasound(s).    Portions of the record may have been created with voice recognition software. Occasional wrong word or \"sound a like\" substitutions may have occurred due to the inherent limitations of voice recognition software. Read the chart carefully and recognize, using context, where substitutions have occurred.    "

## 2024-12-20 NOTE — PATIENT INSTRUCTIONS
Continue same dose of Synthroid.    Get lab work completed. We will call with results.    Call with any concerns or questions.    Follow up in 1 year with labs and ultrasound.

## 2024-12-30 LAB
25(OH)D3+25(OH)D2 SERPL-MCNC: 25.6 NG/ML (ref 30–100)
ACTH PLAS-MCNC: 22.5 PG/ML (ref 7.2–63.3)
ALBUMIN SERPL-MCNC: 4.3 G/DL (ref 3.8–4.9)
ALP SERPL-CCNC: 96 IU/L (ref 44–121)
ALT SERPL-CCNC: 16 IU/L (ref 0–32)
AST SERPL-CCNC: 16 IU/L (ref 0–40)
BILIRUB SERPL-MCNC: 0.5 MG/DL (ref 0–1.2)
BUN SERPL-MCNC: 12 MG/DL (ref 6–24)
BUN/CREAT SERPL: 19 (ref 9–23)
CALCIUM SERPL-MCNC: 9.4 MG/DL (ref 8.7–10.2)
CHLORIDE SERPL-SCNC: 102 MMOL/L (ref 96–106)
CO2 SERPL-SCNC: 25 MMOL/L (ref 20–29)
CORTIS SAL-MCNC: 0.01 UG/DL
CREAT SERPL-MCNC: 0.63 MG/DL (ref 0.57–1)
EGFR: 103 ML/MIN/1.73
ENDOMYSIUM IGA SER QL: NEGATIVE
GLIADIN PEPTIDE IGA SER-ACNC: 4 UNITS (ref 0–19)
GLIADIN PEPTIDE IGG SER-ACNC: 3 UNITS (ref 0–19)
GLOBULIN SER-MCNC: 2.1 G/DL (ref 1.5–4.5)
GLUCOSE SERPL-MCNC: 87 MG/DL (ref 70–99)
IGA SERPL-MCNC: 129 MG/DL (ref 87–352)
IGF-I SERPL-MCNC: 72 NG/ML (ref 60–207)
POTASSIUM SERPL-SCNC: 3.7 MMOL/L (ref 3.5–5.2)
PROT SERPL-MCNC: 6.4 G/DL (ref 6–8.5)
SODIUM SERPL-SCNC: 139 MMOL/L (ref 134–144)
T4 FREE SERPL-MCNC: 1.14 NG/DL (ref 0.82–1.77)
TSH SERPL DL<=0.005 MIU/L-ACNC: 3.32 UIU/ML (ref 0.45–4.5)
TTG IGA SER-ACNC: <2 U/ML (ref 0–3)
TTG IGG SER-ACNC: 3 U/ML (ref 0–5)

## 2025-01-02 ENCOUNTER — RESULTS FOLLOW-UP (OUTPATIENT)
Dept: ENDOCRINOLOGY | Facility: HOSPITAL | Age: 60
End: 2025-01-02

## 2025-08-20 PROBLEM — Z13.6 SCREENING FOR CARDIOVASCULAR CONDITION: Status: ACTIVE | Noted: 2025-08-20

## 2025-08-20 PROBLEM — Z13.1 SCREENING FOR DIABETES MELLITUS: Status: ACTIVE | Noted: 2025-08-20

## 2025-08-21 ENCOUNTER — OFFICE VISIT (OUTPATIENT)
Dept: INTERNAL MEDICINE CLINIC | Facility: CLINIC | Age: 60
End: 2025-08-21
Payer: COMMERCIAL

## 2025-08-21 VITALS
RESPIRATION RATE: 12 BRPM | HEIGHT: 69 IN | DIASTOLIC BLOOD PRESSURE: 82 MMHG | BODY MASS INDEX: 31.7 KG/M2 | HEART RATE: 78 BPM | OXYGEN SATURATION: 98 % | SYSTOLIC BLOOD PRESSURE: 130 MMHG | TEMPERATURE: 97.8 F | WEIGHT: 214 LBS

## 2025-08-21 DIAGNOSIS — Z00.00 ANNUAL PHYSICAL EXAM: Primary | ICD-10-CM

## 2025-08-21 DIAGNOSIS — Z12.31 ENCOUNTER FOR SCREENING MAMMOGRAM FOR BREAST CANCER: ICD-10-CM

## 2025-08-21 DIAGNOSIS — E55.9 VITAMIN D DEFICIENCY: ICD-10-CM

## 2025-08-21 DIAGNOSIS — K21.9 GASTROESOPHAGEAL REFLUX DISEASE WITHOUT ESOPHAGITIS: ICD-10-CM

## 2025-08-21 DIAGNOSIS — Z12.12 SCREENING FOR COLORECTAL CANCER: ICD-10-CM

## 2025-08-21 DIAGNOSIS — G47.33 OSA (OBSTRUCTIVE SLEEP APNEA): ICD-10-CM

## 2025-08-21 DIAGNOSIS — M51.369 BULGING LUMBAR DISC: ICD-10-CM

## 2025-08-21 DIAGNOSIS — E06.3 HYPOTHYROIDISM DUE TO HASHIMOTO'S THYROIDITIS: ICD-10-CM

## 2025-08-21 DIAGNOSIS — Z13.1 SCREENING FOR DIABETES MELLITUS: ICD-10-CM

## 2025-08-21 DIAGNOSIS — G25.81 RESTLESS LEGS SYNDROME: ICD-10-CM

## 2025-08-21 DIAGNOSIS — H25.013 CORTICAL AGE-RELATED CATARACT OF BOTH EYES: ICD-10-CM

## 2025-08-21 DIAGNOSIS — R53.82 CHRONIC FATIGUE: ICD-10-CM

## 2025-08-21 DIAGNOSIS — Z12.11 SCREENING FOR COLORECTAL CANCER: ICD-10-CM

## 2025-08-21 DIAGNOSIS — Z13.6 SCREENING FOR CARDIOVASCULAR CONDITION: ICD-10-CM

## 2025-08-21 PROCEDURE — 99396 PREV VISIT EST AGE 40-64: CPT | Performed by: INTERNAL MEDICINE
